# Patient Record
Sex: FEMALE | Race: WHITE | NOT HISPANIC OR LATINO | Employment: FULL TIME | URBAN - NONMETROPOLITAN AREA
[De-identification: names, ages, dates, MRNs, and addresses within clinical notes are randomized per-mention and may not be internally consistent; named-entity substitution may affect disease eponyms.]

---

## 2024-04-14 ENCOUNTER — HOSPITAL ENCOUNTER (EMERGENCY)
Facility: HOSPITAL | Age: 24
Discharge: HOME | End: 2024-04-15
Attending: STUDENT IN AN ORGANIZED HEALTH CARE EDUCATION/TRAINING PROGRAM | Admitting: STUDENT IN AN ORGANIZED HEALTH CARE EDUCATION/TRAINING PROGRAM
Payer: COMMERCIAL

## 2024-04-14 DIAGNOSIS — F32.A DEPRESSION, UNSPECIFIED DEPRESSION TYPE: Primary | ICD-10-CM

## 2024-04-14 DIAGNOSIS — R45.851 PASSIVE SUICIDAL IDEATIONS: ICD-10-CM

## 2024-04-14 PROCEDURE — 93005 ELECTROCARDIOGRAM TRACING: CPT | Performed by: PHYSICIAN ASSISTANT

## 2024-04-14 PROCEDURE — 99285 EMERGENCY DEPT VISIT HI MDM: CPT

## 2024-04-14 RX ORDER — DESVENLAFAXINE SUCCINATE 50 MG/1
50 TABLET, EXTENDED RELEASE ORAL DAILY
COMMUNITY

## 2024-04-15 VITALS
HEART RATE: 105 BPM | TEMPERATURE: 98 F | OXYGEN SATURATION: 98 % | BODY MASS INDEX: 37.73 KG/M2 | SYSTOLIC BLOOD PRESSURE: 100 MMHG | RESPIRATION RATE: 18 BRPM | DIASTOLIC BLOOD PRESSURE: 67 MMHG | HEIGHT: 62 IN | WEIGHT: 205 LBS

## 2024-04-15 LAB
ALBUMIN SERPL-MCNC: 4.5 G/DL (ref 3.5–5.2)
ALBUMIN/GLOB SERPL: 1.7 G/DL
ALP SERPL-CCNC: 66 U/L (ref 39–117)
ALT SERPL W P-5'-P-CCNC: 43 U/L (ref 1–33)
AMPHET+METHAMPHET UR QL: NEGATIVE
AMPHETAMINES UR QL: NEGATIVE
ANION GAP SERPL CALCULATED.3IONS-SCNC: 13.9 MMOL/L (ref 5–15)
APAP SERPL-MCNC: <5 MCG/ML (ref 0–30)
AST SERPL-CCNC: 24 U/L (ref 1–32)
B-HCG UR QL: NEGATIVE
BACTERIA UR QL AUTO: ABNORMAL /HPF
BARBITURATES UR QL SCN: NEGATIVE
BASOPHILS # BLD AUTO: 0 10*3/MM3 (ref 0–0.2)
BASOPHILS NFR BLD AUTO: 0 % (ref 0–1.5)
BENZODIAZ UR QL SCN: NEGATIVE
BILIRUB SERPL-MCNC: 0.7 MG/DL (ref 0–1.2)
BILIRUB UR QL STRIP: NEGATIVE
BUN SERPL-MCNC: 14 MG/DL (ref 6–20)
BUN/CREAT SERPL: 19.4 (ref 7–25)
BUPRENORPHINE SERPL-MCNC: NEGATIVE NG/ML
CALCIUM SPEC-SCNC: 9.8 MG/DL (ref 8.6–10.5)
CANNABINOIDS SERPL QL: POSITIVE
CHLORIDE SERPL-SCNC: 107 MMOL/L (ref 98–107)
CLARITY UR: ABNORMAL
CO2 SERPL-SCNC: 22.1 MMOL/L (ref 22–29)
COCAINE UR QL: NEGATIVE
COLOR UR: ABNORMAL
CREAT SERPL-MCNC: 0.72 MG/DL (ref 0.57–1)
DEPRECATED RDW RBC AUTO: 39.8 FL (ref 37–54)
EGFRCR SERPLBLD CKD-EPI 2021: 121.4 ML/MIN/1.73
EOSINOPHIL # BLD AUTO: 0 10*3/MM3 (ref 0–0.4)
EOSINOPHIL NFR BLD AUTO: 0 % (ref 0.3–6.2)
ERYTHROCYTE [DISTWIDTH] IN BLOOD BY AUTOMATED COUNT: 12.9 % (ref 12.3–15.4)
ETHANOL BLD-MCNC: <10 MG/DL (ref 0–10)
ETHANOL UR QL: <0.01 %
FENTANYL UR-MCNC: NEGATIVE NG/ML
FLUAV SUBTYP SPEC NAA+PROBE: NOT DETECTED
FLUBV RNA ISLT QL NAA+PROBE: NOT DETECTED
GLOBULIN UR ELPH-MCNC: 2.6 GM/DL
GLUCOSE SERPL-MCNC: 93 MG/DL (ref 65–99)
GLUCOSE UR STRIP-MCNC: NEGATIVE MG/DL
HCT VFR BLD AUTO: 39.7 % (ref 34–46.6)
HGB BLD-MCNC: 13.7 G/DL (ref 12–15.9)
HGB UR QL STRIP.AUTO: ABNORMAL
HYALINE CASTS UR QL AUTO: ABNORMAL /LPF
IMM GRANULOCYTES # BLD AUTO: 0.02 10*3/MM3 (ref 0–0.05)
IMM GRANULOCYTES NFR BLD AUTO: 0.3 % (ref 0–0.5)
KETONES UR QL STRIP: NEGATIVE
LEUKOCYTE ESTERASE UR QL STRIP.AUTO: ABNORMAL
LYMPHOCYTES # BLD AUTO: 3.16 10*3/MM3 (ref 0.7–3.1)
LYMPHOCYTES NFR BLD AUTO: 44.1 % (ref 19.6–45.3)
MCH RBC QN AUTO: 29.5 PG (ref 26.6–33)
MCHC RBC AUTO-ENTMCNC: 34.5 G/DL (ref 31.5–35.7)
MCV RBC AUTO: 85.6 FL (ref 79–97)
METHADONE UR QL SCN: NEGATIVE
MONOCYTES # BLD AUTO: 0.66 10*3/MM3 (ref 0.1–0.9)
MONOCYTES NFR BLD AUTO: 9.2 % (ref 5–12)
MUCOUS THREADS URNS QL MICRO: ABNORMAL /HPF
NEUTROPHILS NFR BLD AUTO: 3.32 10*3/MM3 (ref 1.7–7)
NEUTROPHILS NFR BLD AUTO: 46.4 % (ref 42.7–76)
NITRITE UR QL STRIP: NEGATIVE
NRBC BLD AUTO-RTO: 0 /100 WBC (ref 0–0.2)
OPIATES UR QL: NEGATIVE
OXYCODONE UR QL SCN: NEGATIVE
PCP UR QL SCN: NEGATIVE
PH UR STRIP.AUTO: 5.5 [PH] (ref 5–8)
PLATELET # BLD AUTO: 302 10*3/MM3 (ref 140–450)
PMV BLD AUTO: 9.6 FL (ref 6–12)
POTASSIUM SERPL-SCNC: 3.5 MMOL/L (ref 3.5–5.2)
PROT SERPL-MCNC: 7.1 G/DL (ref 6–8.5)
PROT UR QL STRIP: ABNORMAL
RBC # BLD AUTO: 4.64 10*6/MM3 (ref 3.77–5.28)
RBC # UR STRIP: ABNORMAL /HPF
REF LAB TEST METHOD: ABNORMAL
SALICYLATES SERPL-MCNC: <0.3 MG/DL
SARS-COV-2 RNA RESP QL NAA+PROBE: NOT DETECTED
SODIUM SERPL-SCNC: 143 MMOL/L (ref 136–145)
SP GR UR STRIP: >=1.03 (ref 1–1.03)
SQUAMOUS #/AREA URNS HPF: ABNORMAL /HPF
TRICYCLICS UR QL SCN: NEGATIVE
UROBILINOGEN UR QL STRIP: ABNORMAL
WBC # UR STRIP: ABNORMAL /HPF
WBC NRBC COR # BLD AUTO: 7.16 10*3/MM3 (ref 3.4–10.8)

## 2024-04-15 PROCEDURE — 80143 DRUG ASSAY ACETAMINOPHEN: CPT | Performed by: PHYSICIAN ASSISTANT

## 2024-04-15 PROCEDURE — 82077 ASSAY SPEC XCP UR&BREATH IA: CPT | Performed by: PHYSICIAN ASSISTANT

## 2024-04-15 PROCEDURE — 87636 SARSCOV2 & INF A&B AMP PRB: CPT | Performed by: PHYSICIAN ASSISTANT

## 2024-04-15 PROCEDURE — 80053 COMPREHEN METABOLIC PANEL: CPT | Performed by: PHYSICIAN ASSISTANT

## 2024-04-15 PROCEDURE — 81025 URINE PREGNANCY TEST: CPT | Performed by: PHYSICIAN ASSISTANT

## 2024-04-15 PROCEDURE — 80307 DRUG TEST PRSMV CHEM ANLYZR: CPT | Performed by: PHYSICIAN ASSISTANT

## 2024-04-15 PROCEDURE — 85025 COMPLETE CBC W/AUTO DIFF WBC: CPT | Performed by: PHYSICIAN ASSISTANT

## 2024-04-15 PROCEDURE — 80179 DRUG ASSAY SALICYLATE: CPT | Performed by: PHYSICIAN ASSISTANT

## 2024-04-15 PROCEDURE — 81001 URINALYSIS AUTO W/SCOPE: CPT | Performed by: PHYSICIAN ASSISTANT

## 2024-04-15 RX ORDER — HYDROXYZINE PAMOATE 50 MG/1
50 CAPSULE ORAL ONCE
Status: COMPLETED | OUTPATIENT
Start: 2024-04-15 | End: 2024-04-15

## 2024-04-15 RX ORDER — HYDROXYZINE PAMOATE 50 MG/1
50 CAPSULE ORAL 3 TIMES DAILY PRN
Qty: 30 CAPSULE | Refills: 0 | Status: SHIPPED | OUTPATIENT
Start: 2024-04-15

## 2024-04-15 RX ADMIN — HYDROXYZINE PAMOATE 50 MG: 50 CAPSULE ORAL at 00:47
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
## 2024-04-15 NOTE — DISCHARGE INSTRUCTIONS
Take Vistaril as prescribed as needed for anxiety.  Follow-up with mental health team for further outpatient evaluation and management of depression and anxiety.  Return to the ER for any new or worsening symptoms or acute concerns.
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
## 2024-05-08 ENCOUNTER — OFFICE VISIT (OUTPATIENT)
Dept: PSYCHIATRY | Facility: CLINIC | Age: 24
End: 2024-05-08
Payer: COMMERCIAL

## 2024-05-08 VITALS
HEART RATE: 96 BPM | HEIGHT: 62 IN | OXYGEN SATURATION: 98 % | DIASTOLIC BLOOD PRESSURE: 84 MMHG | BODY MASS INDEX: 37.54 KG/M2 | SYSTOLIC BLOOD PRESSURE: 110 MMHG | WEIGHT: 204 LBS

## 2024-05-08 DIAGNOSIS — F41.1 GENERALIZED ANXIETY DISORDER: ICD-10-CM

## 2024-05-08 DIAGNOSIS — F33.2 SEVERE EPISODE OF RECURRENT MAJOR DEPRESSIVE DISORDER, WITHOUT PSYCHOTIC FEATURES: Primary | ICD-10-CM

## 2024-05-08 PROCEDURE — 90792 PSYCH DIAG EVAL W/MED SRVCS: CPT | Performed by: NURSE PRACTITIONER

## 2024-05-08 RX ORDER — DESVENLAFAXINE 100 MG/1
100 TABLET, EXTENDED RELEASE ORAL DAILY
Qty: 30 TABLET | Refills: 5 | Status: SHIPPED | OUTPATIENT
Start: 2024-05-08

## 2024-05-08 RX ORDER — BUSPIRONE HYDROCHLORIDE 10 MG/1
10 TABLET ORAL 2 TIMES DAILY
Qty: 60 TABLET | Refills: 5 | Status: SHIPPED | OUTPATIENT
Start: 2024-05-08

## 2024-06-09 ENCOUNTER — HOSPITAL ENCOUNTER (EMERGENCY)
Facility: HOSPITAL | Age: 24
Discharge: HOME | End: 2024-06-09
Attending: EMERGENCY MEDICINE
Payer: COMMERCIAL

## 2024-06-09 VITALS
HEART RATE: 106 BPM | TEMPERATURE: 98.2 F | RESPIRATION RATE: 16 BRPM | DIASTOLIC BLOOD PRESSURE: 74 MMHG | OXYGEN SATURATION: 100 % | HEIGHT: 62 IN | WEIGHT: 205 LBS | BODY MASS INDEX: 37.73 KG/M2 | SYSTOLIC BLOOD PRESSURE: 112 MMHG

## 2024-06-09 DIAGNOSIS — L02.91 ABSCESS: Primary | ICD-10-CM

## 2024-06-09 PROCEDURE — 6370000000 HC RX 637 (ALT 250 FOR IP): Performed by: EMERGENCY MEDICINE

## 2024-06-09 PROCEDURE — 99283 EMERGENCY DEPT VISIT LOW MDM: CPT

## 2024-06-09 RX ORDER — SULFAMETHOXAZOLE AND TRIMETHOPRIM 800; 160 MG/1; MG/1
1 TABLET ORAL ONCE
Status: COMPLETED | OUTPATIENT
Start: 2024-06-09 | End: 2024-06-09

## 2024-06-09 RX ORDER — NAPROXEN 500 MG/1
500 TABLET ORAL ONCE
Status: DISCONTINUED | OUTPATIENT
Start: 2024-06-09 | End: 2024-06-09

## 2024-06-09 RX ORDER — NAPROXEN 500 MG/1
500 TABLET ORAL 2 TIMES DAILY WITH MEALS
Qty: 60 TABLET | Refills: 0 | Status: SHIPPED | OUTPATIENT
Start: 2024-06-09

## 2024-06-09 RX ORDER — SULFAMETHOXAZOLE AND TRIMETHOPRIM 800; 160 MG/1; MG/1
1 TABLET ORAL 2 TIMES DAILY
Qty: 14 TABLET | Refills: 0 | Status: SHIPPED | OUTPATIENT
Start: 2024-06-09 | End: 2024-06-16

## 2024-06-09 RX ORDER — DESVENLAFAXINE 100 MG/1
100 TABLET, EXTENDED RELEASE ORAL DAILY
COMMUNITY

## 2024-06-09 RX ORDER — BUSPIRONE HYDROCHLORIDE 10 MG/1
10 TABLET ORAL 3 TIMES DAILY
COMMUNITY

## 2024-06-09 RX ORDER — ACETAMINOPHEN 500 MG
1000 TABLET ORAL ONCE
Status: COMPLETED | OUTPATIENT
Start: 2024-06-09 | End: 2024-06-09

## 2024-06-09 RX ADMIN — ACETAMINOPHEN 1000 MG: 500 TABLET ORAL at 20:49

## 2024-06-09 RX ADMIN — SULFAMETHOXAZOLE AND TRIMETHOPRIM 1 TABLET: 800; 160 TABLET ORAL at 20:49

## 2024-06-09 ASSESSMENT — PAIN DESCRIPTION - PAIN TYPE
TYPE: ACUTE PAIN
TYPE: ACUTE PAIN

## 2024-06-09 ASSESSMENT — PAIN SCALES - GENERAL
PAINLEVEL_OUTOF10: 9
PAINLEVEL_OUTOF10: 9

## 2024-06-09 ASSESSMENT — LIFESTYLE VARIABLES: HOW OFTEN DO YOU HAVE A DRINK CONTAINING ALCOHOL: NEVER

## 2024-06-09 ASSESSMENT — PAIN DESCRIPTION - DESCRIPTORS: DESCRIPTORS: STABBING;SHARP;THROBBING

## 2024-06-09 ASSESSMENT — PAIN - FUNCTIONAL ASSESSMENT
PAIN_FUNCTIONAL_ASSESSMENT: 0-10
PAIN_FUNCTIONAL_ASSESSMENT: 0-10

## 2024-06-09 ASSESSMENT — PAIN DESCRIPTION - ORIENTATION: ORIENTATION: RIGHT

## 2024-06-09 ASSESSMENT — PAIN DESCRIPTION - LOCATION: LOCATION: BREAST

## 2024-06-10 NOTE — ED PROVIDER NOTES
[facility]  EMERGENCY DEPARTMENT ENCOUNTER        Pt Name: Vernell Shirley  MRN: [id number]  Birthdate 2000  Date of evaluation: 6/9/2024  Provider: Sylvester Razo DO  PCP: No primary care provider on file.  Note Started: 8:18 PM EDT 6/9/24    CHIEF COMPLAINT      Abscess    HISTORY OF PRESENT ILLNESS: 1 or more Elements     Chief Complaint   Patient presents with    Abscess     History from : Patient  Limitations to history : None    Vernell Shirley is a 23 y.o. female who presents to the emergency department secondary to concern for a possible abscess under her right breast.  Reports that it has been going on for about 2 weeks and is worsening.  Reports taken ibuprofen about an hour prior to arrival with no relief.  She denies recent antibiotics.  She does take metformin for PCOS related insulin resistance, but denies history of diabetes.    Past medical history noted below. Aside from what is stated above denies any other symptoms or modifying factors.   reports that she has never smoked. She has never used smokeless tobacco. She reports that she does not currently use alcohol. She reports that she does not currently use drugs.  Nursing Notes were all reviewed and agreed with or any disagreements addressed in HPI/MDM.  REVIEW OF SYSTEMS :    Review of Systems   Constitutional:  Negative for chills and fever.   HENT:  Negative for congestion and rhinorrhea.    Eyes:  Negative for pain and redness.   Respiratory:  Negative for cough and shortness of breath.    Cardiovascular:  Negative for chest pain and leg swelling.   Gastrointestinal:  Negative for abdominal pain, constipation, diarrhea, nausea and vomiting.   Genitourinary:  Negative for frequency and urgency.   Musculoskeletal:  Negative for back pain and neck pain.   Skin:  Positive for rash.   Neurological:  Negative for facial asymmetry and weakness.   Psychiatric/Behavioral:  Negative for agitation and confusion.    All

## 2024-06-10 NOTE — ED NOTES
Pt with pulse 106.  Doctor aware.  Documented pulse of 105 during previous hospital visit in April and 108 in January 2024.

## 2024-06-10 NOTE — ED NOTES
Reviewed discharge plan with Vernell Shirley.  Encouraged her to f/u with Radiology for ultra sound and with Dr Dupont as needed and she understood.  NAD noted on discharge, gait steady.  Reviewed discharge prescription for:     Current Discharge Medication List        START taking these medications    Details   naproxen (NAPROSYN) 500 MG tablet Take 1 tablet by mouth 2 times daily (with meals)  Qty: 60 tablet, Refills: 0      sulfamethoxazole-trimethoprim (BACTRIM DS;SEPTRA DS) 800-160 MG per tablet Take 1 tablet by mouth 2 times daily for 7 days  Qty: 14 tablet, Refills: 0      Zinc Oxide 10 % OINT Apply 1 Application topically in the morning and 1 Application at noon and 1 Application in the evening.  Qty: 56.7 g, Refills: 0             Vernell states understanding of how and when to take medications.      Electronically signed by Tere Gold RN on 6/9/2024 at 9:03 PM

## 2024-06-11 ENCOUNTER — HOSPITAL ENCOUNTER (EMERGENCY)
Facility: HOSPITAL | Age: 24
Discharge: HOME | End: 2024-06-12
Attending: EMERGENCY MEDICINE
Payer: COMMERCIAL

## 2024-06-11 DIAGNOSIS — L02.213 ABSCESS OF CHEST WALL: Primary | ICD-10-CM

## 2024-06-11 PROCEDURE — 99283 EMERGENCY DEPT VISIT LOW MDM: CPT

## 2024-06-11 PROCEDURE — 63710000001 ONDANSETRON ODT 4 MG TABLET DISPERSIBLE: Performed by: EMERGENCY MEDICINE

## 2024-06-11 RX ORDER — ONDANSETRON 4 MG/1
4 TABLET, ORALLY DISINTEGRATING ORAL ONCE
Status: COMPLETED | OUTPATIENT
Start: 2024-06-11 | End: 2024-06-11

## 2024-06-11 RX ORDER — HYDROCODONE BITARTRATE AND ACETAMINOPHEN 5; 325 MG/1; MG/1
1 TABLET ORAL ONCE
Status: COMPLETED | OUTPATIENT
Start: 2024-06-11 | End: 2024-06-11

## 2024-06-11 RX ORDER — LIDOCAINE HYDROCHLORIDE AND EPINEPHRINE 10; 10 MG/ML; UG/ML
10 INJECTION, SOLUTION INFILTRATION; PERINEURAL ONCE
Status: COMPLETED | OUTPATIENT
Start: 2024-06-11 | End: 2024-06-11

## 2024-06-11 RX ADMIN — ONDANSETRON 4 MG: 4 TABLET, ORALLY DISINTEGRATING ORAL at 23:41

## 2024-06-11 RX ADMIN — HYDROCODONE BITARTRATE AND ACETAMINOPHEN 1 TABLET: 5; 325 TABLET ORAL at 23:41

## 2024-06-11 RX ADMIN — LIDOCAINE HYDROCHLORIDE AND EPINEPHRINE 10 ML: 10; 10 INJECTION, SOLUTION INFILTRATION; PERINEURAL at 23:50

## 2024-06-12 VITALS
HEIGHT: 62 IN | SYSTOLIC BLOOD PRESSURE: 110 MMHG | WEIGHT: 208 LBS | HEART RATE: 84 BPM | TEMPERATURE: 98 F | RESPIRATION RATE: 18 BRPM | DIASTOLIC BLOOD PRESSURE: 72 MMHG | OXYGEN SATURATION: 98 % | BODY MASS INDEX: 38.28 KG/M2

## 2024-06-12 RX ORDER — CEPHALEXIN 500 MG/1
500 CAPSULE ORAL 3 TIMES DAILY
Qty: 30 CAPSULE | Refills: 0 | Status: SHIPPED | OUTPATIENT
Start: 2024-06-12 | End: 2024-06-22

## 2024-06-12 RX ORDER — SULFAMETHOXAZOLE AND TRIMETHOPRIM 800; 160 MG/1; MG/1
1 TABLET ORAL 2 TIMES DAILY
Qty: 20 TABLET | Refills: 0 | Status: SHIPPED | OUTPATIENT
Start: 2024-06-12 | End: 2024-06-22

## 2024-06-12 RX ORDER — CEPHALEXIN 250 MG/1
500 CAPSULE ORAL ONCE
Status: COMPLETED | OUTPATIENT
Start: 2024-06-12 | End: 2024-06-12

## 2024-06-12 RX ORDER — SULFAMETHOXAZOLE AND TRIMETHOPRIM 800; 160 MG/1; MG/1
1 TABLET ORAL ONCE
Status: COMPLETED | OUTPATIENT
Start: 2024-06-12 | End: 2024-06-12

## 2024-06-12 RX ADMIN — SULFAMETHOXAZOLE AND TRIMETHOPRIM 1 TABLET: 800; 160 TABLET ORAL at 00:12

## 2024-06-12 RX ADMIN — CEPHALEXIN 500 MG: 250 CAPSULE ORAL at 00:12

## 2024-06-12 NOTE — DISCHARGE INSTRUCTIONS
Please take your antibiotics as prescribed and follow-up with your primary care doctor for recheck of your wound in the next 2 to 3 days.  Return to the ER for any acute changes or worsening of your condition.
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
## 2024-08-01 ENCOUNTER — OFFICE VISIT (OUTPATIENT)
Dept: PSYCHIATRY | Facility: CLINIC | Age: 24
End: 2024-08-01
Payer: COMMERCIAL

## 2024-08-01 VITALS
SYSTOLIC BLOOD PRESSURE: 112 MMHG | WEIGHT: 213.8 LBS | OXYGEN SATURATION: 99 % | BODY MASS INDEX: 39.34 KG/M2 | DIASTOLIC BLOOD PRESSURE: 74 MMHG | HEIGHT: 62 IN | HEART RATE: 97 BPM

## 2024-08-01 DIAGNOSIS — F33.2 SEVERE EPISODE OF RECURRENT MAJOR DEPRESSIVE DISORDER, WITHOUT PSYCHOTIC FEATURES: Primary | Chronic | ICD-10-CM

## 2024-08-01 DIAGNOSIS — F41.1 GENERALIZED ANXIETY DISORDER: Chronic | ICD-10-CM

## 2024-08-01 PROCEDURE — 99214 OFFICE O/P EST MOD 30 MIN: CPT | Performed by: NURSE PRACTITIONER

## 2024-08-01 RX ORDER — METFORMIN HYDROCHLORIDE 500 MG/1
TABLET, EXTENDED RELEASE ORAL
COMMUNITY
Start: 2024-07-15

## 2024-08-01 NOTE — PROGRESS NOTES
"Chief Complaint  Anxiety and Depression    Subjective          Yuki Singh presents to [facility] for medication management of her anxiety and depression.    History of Present Illness: Patient presents today for follow-up appointment after last being seen on 05/08/2024 for an initial evaluation.  Patient says today \"I thought things have been getting better, but then last night my  told me he has no feelings for me and has not since March of this year\".  Patient says they had been spending more time with his family and says they seem to all be getting along very well.  She says this all came out of nowhere for her and she is very shocked today.  Patient says she just is not sure what the next steps are going to look like.  Patient says she is still working at the same job and has no concerns there.  She says work seems to be going okay.  She also says she is still taking her medications on a consistent basis and feels until yesterday they are working great for her.  Patient says \"they were working so well, it was to the point that I was doing so well I thought I was cured.  I thought it was all great\".  Patient says she just is not sure where her head is today based on last night.  She says her sleep and appetite are about the same and denies any significant issues or concerns.  She says she is just crushed emotionally right now.  She denies any SI/HI, A/V hallucinations.      The following portions of the patient's history were reviewed and updated as appropriate: allergies, current medications, past family history, past medical history, past social history, past surgical history and problem list.      Current Medications:   Current Outpatient Medications   Medication Sig Dispense Refill    busPIRone (BUSPAR) 10 MG tablet Take 1 tablet by mouth 2 (Two) Times a Day. 60 tablet 5    desvenlafaxine (PRISTIQ) 100 MG 24 hr tablet Take 1 tablet by mouth Daily. 30 tablet 5    " "metFORMIN ER (GLUCOPHAGE-XR) 500 MG 24 hr tablet TAKE 4 TABLETS BY MOUTH EVERY EVENING       No current facility-administered medications for this visit.       Mental Status Exam:   Hygiene:   good  Cooperation:  Cooperative  Eye Contact:  Good  Psychomotor Behavior:  Restless  Affect:   Tearful  Mood: anxious  Speech:  Normal  Thought Process:  Goal directed  Thought Content:  Mood congruent  Suicidal:  None  Homicidal:  None  Hallucinations:  None  Delusion:  None  Memory:  Intact  Orientation:  Person, Place, Time, and Situation  Reliability:  good  Insight:  Good  Judgement:  Good  Impulse Control:  Good  Physical/Medical Issues:   PCOS, GERD        Objective   Vital Signs:   /74   Pulse 97   Ht 157.5 cm (62\")   Wt 97 kg (213 lb 12.8 oz)   SpO2 99%   BMI 39.10 kg/m²     Physical Exam  Neurological:      Mental Status: She is oriented to person, place, and time. Mental status is at baseline.      Coordination: Coordination is intact.      Gait: Gait is intact.   Psychiatric:         Behavior: Behavior is cooperative.        Result Review :     The following data was reviewed by: ADEN Shah on 08/01/2024:    Data reviewed : Previous note, medication history           Assessment and Plan    Diagnoses and all orders for this visit:    1. Severe episode of recurrent major depressive disorder, without psychotic features (Primary)    2. Generalized anxiety disorder         PHQ-9 Score:   PHQ-9 Total Score: 15      Depression Screening:  Patient screened positive for depression based on a PHQ-9 score of 15 on 8/1/2024. Follow-up recommendations include: Prescribed antidepressant medication treatment and Suicide Risk Assessment performed.        Tobacco Cessation:  Patient has denied an present or past tobacco use. No tobacco cessation education necessary.       Impression/Plan:  -This is a follow-up appointment.  Patient presents today and says she is not doing very well.  Patient reports she was " doing much better prior to yesterday when as discussed above her  revealed he no longer has feelings for her.  Patient says she is not sure if this means they are , or possibly getting a divorce.  She says she just does not know where anything is right now.  She does say prior to yesterday she felt she was doing significantly better and was very pleased with where she was mentally.  She does say she is still taking her medications on a consistent basis and denies any adverse effects associated with them.  At her last appointment her Pristiq was increased and she was started on buspirone.  She feels this medication regimen has been adequate.  We will maintain her medication regimen as it is currently prescribed.  I encouraged her to make sure she keeps her next scheduled therapy appointment which she reports is next Wednesday.  -Maintain Pristiq 100 mg daily for depression and anxiety.  -Maintain buspirone 10 mg twice daily for anxiety.  -Patient's [facility] report reviewed and deemed appropriate.  Patient counseled on use of controlled substances.  -Reviewed available lab work.  -Schedule in person follow-up appointment for 2 months or as needed.      MEDS ORDERED DURING VISIT:  No orders of the defined types were placed in this encounter.        Follow Up   Return in about 2 months (around 10/1/2024), or if symptoms worsen or fail to improve, for Next scheduled follow up, In-Person Appt.  Patient was given instructions and counseling regarding her condition or for health maintenance advice. Please see specific information pulled into the AVS if appropriate.       TREATMENT PLAN/GOALS: Continue supportive psychotherapy efforts and medications as indicated. Treatment and medication options discussed during today's visit. Patient acknowledged and verbally consented to continue with current treatment plan and was educated on the importance of compliance with treatment and follow-up  appointments.    MEDICATION ISSUES:  Discussed medication options and treatment plan of prescribed medication as well as the risks, benefits, and side effects including potential falls, possible impaired driving and metabolic adversities among others. Patient is agreeable to call the office with any worsening of symptoms or onset of side effects. Patient is agreeable to call 911 or go to the nearest ER should he/she begin having SI/HI.          This document has been electronically signed by ADEN Gonzalez, PMHNP-BC  August 1, 2024 20:29 EDT      Part of this note may be an electronic transcription/translation of spoken language to printed text using the Dragon Dictation System.

## 2024-08-29 DIAGNOSIS — F33.2 SEVERE EPISODE OF RECURRENT MAJOR DEPRESSIVE DISORDER, WITHOUT PSYCHOTIC FEATURES: ICD-10-CM

## 2024-08-29 DIAGNOSIS — F41.1 GENERALIZED ANXIETY DISORDER: ICD-10-CM

## 2024-08-29 RX ORDER — BUSPIRONE HYDROCHLORIDE 10 MG/1
10 TABLET ORAL 2 TIMES DAILY
Qty: 60 TABLET | Refills: 5 | Status: SHIPPED | OUTPATIENT
Start: 2024-08-29

## 2024-08-29 RX ORDER — DESVENLAFAXINE 100 MG/1
100 TABLET, EXTENDED RELEASE ORAL DAILY
Qty: 30 TABLET | Refills: 5 | Status: SHIPPED | OUTPATIENT
Start: 2024-08-29

## 2024-08-29 NOTE — TELEPHONE ENCOUNTER
Patient wants scripts sent to [facility] [place], [place].    Rx Refill Note  Requested Prescriptions     Pending Prescriptions Disp Refills    busPIRone (BUSPAR) 10 MG tablet 60 tablet 5     Sig: Take 1 tablet by mouth 2 (Two) Times a Day.    desvenlafaxine (PRISTIQ) 100 MG 24 hr tablet 30 tablet 5     Sig: Take 1 tablet by mouth Daily.      Last office visit with prescribing clinician: 8/1/2024   Last telemedicine visit with prescribing clinician: Visit date not found   Next office visit with prescribing clinician: 10/21/2024                         Would you like a call back once the refill request has been completed: [] Yes [] No    If the office needs to give you a call back, can they leave a voicemail: [] Yes [] No    Janell Fine CMA  08/29/24, 11:39 EDT

## 2024-10-21 ENCOUNTER — OFFICE VISIT (OUTPATIENT)
Dept: PSYCHIATRY | Facility: CLINIC | Age: 24
End: 2024-10-21
Payer: COMMERCIAL

## 2024-10-21 VITALS
BODY MASS INDEX: 39.75 KG/M2 | HEIGHT: 62 IN | DIASTOLIC BLOOD PRESSURE: 72 MMHG | HEART RATE: 105 BPM | SYSTOLIC BLOOD PRESSURE: 124 MMHG | WEIGHT: 216 LBS | OXYGEN SATURATION: 98 %

## 2024-10-21 DIAGNOSIS — G47.09 OTHER INSOMNIA: ICD-10-CM

## 2024-10-21 DIAGNOSIS — F33.2 SEVERE EPISODE OF RECURRENT MAJOR DEPRESSIVE DISORDER, WITHOUT PSYCHOTIC FEATURES: Chronic | ICD-10-CM

## 2024-10-21 DIAGNOSIS — F41.1 GENERALIZED ANXIETY DISORDER: Primary | Chronic | ICD-10-CM

## 2024-10-21 PROCEDURE — 99214 OFFICE O/P EST MOD 30 MIN: CPT | Performed by: NURSE PRACTITIONER

## 2024-10-21 RX ORDER — TRAZODONE HYDROCHLORIDE 50 MG/1
50 TABLET, FILM COATED ORAL NIGHTLY
Qty: 30 TABLET | Refills: 2 | Status: SHIPPED | OUTPATIENT
Start: 2024-10-21

## 2024-10-21 RX ORDER — DESVENLAFAXINE 100 MG/1
100 TABLET, EXTENDED RELEASE ORAL DAILY
Qty: 30 TABLET | Refills: 5 | Status: SHIPPED | OUTPATIENT
Start: 2024-10-21

## 2024-10-21 RX ORDER — BUSPIRONE HYDROCHLORIDE 10 MG/1
10 TABLET ORAL 2 TIMES DAILY
Qty: 60 TABLET | Refills: 5 | Status: SHIPPED | OUTPATIENT
Start: 2024-10-21

## 2024-10-21 NOTE — PROGRESS NOTES
"Chief Complaint  Anxiety, Depression, and Sleeping Problem    Subjective              Yuki Singh presents to [facility] for medication management of her anxiety, depression and sleeping difficulties.    History of Present Illness: Patient presents today for follow-up appointment after last being seen on 08/01/2024.  Patient presents today and says \"12 days after my last appointment has been left\".  She says he texted her and told her he was not coming home and she has only seen him a couple times since then.  She feels alone.  Her parents are here but are getting ready to move back to [place] and her relationship is very complicated with them as it is.  She says her other siblings just do not understand.  Patient says she feels like she wasted the last year of her life and says \"I spent all that time and money and he still left me\".  She is not sleeping and says she is afraid to go into her bedroom because it is \"a hard reminder that I am all alone\".  She is often up late and then struggles to get up in the morning to get to work on time.  She reports she has continued therapy but has not been very consistent with her medications.  She says she does take buspirone daily but often forgets to take her Pristiq \"until I had a splitting headache\".  Her appetite has been diminished over the last several weeks.  She says work is the only thing that is \"keeping me sane\".  She denies any SI/HI, A/V hallucinations.      The following portions of the patient's history were reviewed and updated as appropriate: allergies, current medications, past family history, past medical history, past social history, past surgical history and problem list.      Current Medications:   Current Outpatient Medications   Medication Sig Dispense Refill    busPIRone (BUSPAR) 10 MG tablet Take 1 tablet by mouth 2 (Two) Times a Day. 60 tablet 5    desvenlafaxine (PRISTIQ) 100 MG 24 hr tablet Take 1 tablet by " "mouth Daily. 30 tablet 5    metFORMIN ER (GLUCOPHAGE-XR) 500 MG 24 hr tablet TAKE 4 TABLETS BY MOUTH EVERY EVENING      traZODone (DESYREL) 50 MG tablet Take 1 tablet by mouth Every Night. 30 tablet 2     No current facility-administered medications for this visit.       Mental Status Exam:   Hygiene:   good  Cooperation:  Cooperative  Eye Contact:  Good  Psychomotor Behavior:  Restless  Affect:   Tearful  Mood: sad and anxious  Speech:  Normal  Thought Process:  Goal directed  Thought Content:  Mood congruent  Suicidal:  None  Homicidal:  None  Hallucinations:  None  Delusion:  None  Memory:  Intact  Orientation:  Person, Place, Time, and Situation  Reliability:  good  Insight:  Good  Judgement:  Good  Impulse Control:  Good  Physical/Medical Issues:   PCOS, GERD        Objective   Vital Signs:   /72   Pulse 105   Ht 157.5 cm (62\")   Wt 98 kg (216 lb)   SpO2 98%   BMI 39.51 kg/m²     Physical Exam  Neurological:      Mental Status: She is oriented to person, place, and time. Mental status is at baseline.      Coordination: Coordination is intact.      Gait: Gait is intact.   Psychiatric:         Behavior: Behavior is cooperative.        Result Review :     The following data was reviewed by: ADEN Shah on 10/21/2024:    Data reviewed : Previous note, medication history           Assessment and Plan    Diagnoses and all orders for this visit:    1. Generalized anxiety disorder (Primary)  -     busPIRone (BUSPAR) 10 MG tablet; Take 1 tablet by mouth 2 (Two) Times a Day.  Dispense: 60 tablet; Refill: 5  -     desvenlafaxine (PRISTIQ) 100 MG 24 hr tablet; Take 1 tablet by mouth Daily.  Dispense: 30 tablet; Refill: 5    2. Severe episode of recurrent major depressive disorder, without psychotic features  -     desvenlafaxine (PRISTIQ) 100 MG 24 hr tablet; Take 1 tablet by mouth Daily.  Dispense: 30 tablet; Refill: 5    3. Other insomnia  -     traZODone (DESYREL) 50 MG tablet; Take 1 tablet by mouth " Every Night.  Dispense: 30 tablet; Refill: 2           PHQ-9 Score:   PHQ-9 Total Score: 27        Depression Screening:  Patient screened positive for depression based on a PHQ-9 score of 27 on 10/21/2024. Follow-up recommendations include: Prescribed antidepressant medication treatment and Suicide Risk Assessment performed.        Tobacco Cessation:  Patient has denied an present or past tobacco use. No tobacco cessation education necessary.       Impression/Plan:  -This is a follow-up appointment.  Patient presents today and reports she has been struggling significantly over the last several weeks.  As discussed above she is now  from her  and believes he is filing for divorce but says she cannot say for sure what has happened because she is not sure.  She does say she signed some papers through a  but does not know exactly what that means.  She has not been consistent with her medications admitting that she often forgets to take her Pristiq for a few days at a time.  She does say she is taking her buspirone twice a day on a consistent basis.  Her biggest concern right now is that she cannot sleep consistently.  She says she is generally awake until the early hours of the morning.  She has tried taking melatonin and Benadryl in the past but says they are not very efficacious and often just cause her to feel very groggy the next day.  She is interested in trying something different.  I encouraged her to be more consistent with her current medications.  -Start trazodone 50 mg nightly for sleeping difficulties.  We discussed risks versus benefits, as well as potential adverse effects associated with adding this medication to patient's daily regimen. Patient is in agreement with this plan and was educated on the importance of compliance with all aspects of treatment and follow-up appointments. Patient is agreeable to call the office with any worsening of symptoms or onset of side  effects.  -Maintain Pristiq 100 mg daily for depression and anxiety.  -Maintain buspirone 10 mg twice daily for anxiety.  -Patient's [facility] report reviewed and deemed appropriate.  Patient counseled on use of controlled substances.  -Reviewed available lab work.  -Schedule in person follow-up appointment for 1 month or as needed.      MEDS ORDERED DURING VISIT:  New Medications Ordered This Visit   Medications    traZODone (DESYREL) 50 MG tablet     Sig: Take 1 tablet by mouth Every Night.     Dispense:  30 tablet     Refill:  2    busPIRone (BUSPAR) 10 MG tablet     Sig: Take 1 tablet by mouth 2 (Two) Times a Day.     Dispense:  60 tablet     Refill:  5    desvenlafaxine (PRISTIQ) 100 MG 24 hr tablet     Sig: Take 1 tablet by mouth Daily.     Dispense:  30 tablet     Refill:  5         Follow Up   Return in about 1 month (around 11/21/2024), or if symptoms worsen or fail to improve, for Next scheduled follow up, In-Person Appt.  Patient was given instructions and counseling regarding her condition or for health maintenance advice. Please see specific information pulled into the AVS if appropriate.       TREATMENT PLAN/GOALS: Continue supportive psychotherapy efforts and medications as indicated. Treatment and medication options discussed during today's visit. Patient acknowledged and verbally consented to continue with current treatment plan and was educated on the importance of compliance with treatment and follow-up appointments.    MEDICATION ISSUES:  Discussed medication options and treatment plan of prescribed medication as well as the risks, benefits, and side effects including potential falls, possible impaired driving and metabolic adversities among others. Patient is agreeable to call the office with any worsening of symptoms or onset of side effects. Patient is agreeable to call 911 or go to the nearest ER should he/she begin having SI/HI.          This document has been electronically signed by Shari  ADEN Benito, PMHNP-BC  October 21, 2024 16:28 EDT      Part of this note may be an electronic transcription/translation of spoken language to printed text using the Dragon Dictation System.

## 2024-11-18 ENCOUNTER — OFFICE VISIT (OUTPATIENT)
Dept: PSYCHIATRY | Facility: CLINIC | Age: 24
End: 2024-11-18
Payer: COMMERCIAL

## 2024-11-18 VITALS
SYSTOLIC BLOOD PRESSURE: 126 MMHG | DIASTOLIC BLOOD PRESSURE: 90 MMHG | BODY MASS INDEX: 38.09 KG/M2 | OXYGEN SATURATION: 99 % | WEIGHT: 207 LBS | HEIGHT: 62 IN | HEART RATE: 115 BPM

## 2024-11-18 DIAGNOSIS — F33.1 MODERATE EPISODE OF RECURRENT MAJOR DEPRESSIVE DISORDER: Chronic | ICD-10-CM

## 2024-11-18 DIAGNOSIS — F41.1 GENERALIZED ANXIETY DISORDER: Primary | Chronic | ICD-10-CM

## 2024-11-18 DIAGNOSIS — G47.09 OTHER INSOMNIA: Chronic | ICD-10-CM

## 2024-11-18 PROCEDURE — 99214 OFFICE O/P EST MOD 30 MIN: CPT | Performed by: NURSE PRACTITIONER

## 2024-11-18 RX ORDER — PHENTERMINE HYDROCHLORIDE 37.5 MG/1
37.5 TABLET ORAL
COMMUNITY

## 2024-11-18 NOTE — PROGRESS NOTES
"Chief Complaint  Anxiety, Depression, and Sleeping Problem    Subjective              Yuki Singh presents to [facility] for medication management of her anxiety, depression and sleeping difficulties.    History of Present Illness: Patient presents today for follow-up appointment after last being seen on 10/21/2024.  At that appointment patient was started on trazodone 50 mg to help with sleeping difficulties.  She presents today and says \"I am better than I was last time\".  She says her divorce has progressed and they are just now waiting on the  to sign off and finalize it.  She says the process has been uncontested and says each one of them is getting what they wanted from him.  She says the only thing she will be out is \"all the money that I put into it\".  Their house is under contract and they are just waiting on that process to finalize as well.  She will be getting the profits from the sale.  She says she had any emotional \"epiphany\" this morning that she is not \"garbage\" and is feeling better about herself.  Patient says starting trazodone has been beneficial.  She says she has been taking it throughout the work week but not on the weekends.  She says she has had to find the best time to take it consistently for sleep but says when she wakes up in the morning she has more rested.  She has been consistent with all of her medications and says she has not missed her Pristiq or her BuSpar once.  Patient says she has also been going to [employer] recently with her parents and feels it has continued to be beneficial for her to be around them.  She does say she is not looking forward to the holidays.  Her appetite has been okay but has been decreased some because of medication she is taking for weight loss.  She continues to participate in therapy on a weekly basis and says that is going well.  She denies any SI/HI, A/V hallucinations.      The following portions of the " "patient's history were reviewed and updated as appropriate: allergies, current medications, past family history, past medical history, past social history, past surgical history and problem list.      Current Medications:   Current Outpatient Medications   Medication Sig Dispense Refill    busPIRone (BUSPAR) 10 MG tablet Take 1 tablet by mouth 2 (Two) Times a Day. 60 tablet 5    desvenlafaxine (PRISTIQ) 100 MG 24 hr tablet Take 1 tablet by mouth Daily. 30 tablet 5    phentermine (ADIPEX-P) 37.5 MG tablet Take 1 tablet by mouth Every Morning Before Breakfast.      traZODone (DESYREL) 50 MG tablet Take 1 tablet by mouth Every Night. 30 tablet 2     No current facility-administered medications for this visit.       Mental Status Exam:   Hygiene:   good  Cooperation:  Cooperative  Eye Contact:  Good  Psychomotor Behavior:  Appropriate  Affect:  Appropriate  Mood: euthymic  Speech:  Normal  Thought Process:  Goal directed  Thought Content:  Mood congruent  Suicidal:  None  Homicidal:  None  Hallucinations:  None  Delusion:  None  Memory:  Intact  Orientation:  Person, Place, Time, and Situation  Reliability:  good  Insight:  Good  Judgement:  Good  Impulse Control:  Good  Physical/Medical Issues:   PCOS, GERD        Objective   Vital Signs:   /90   Pulse 115   Ht 157.5 cm (62\")   Wt 93.9 kg (207 lb)   SpO2 99%   BMI 37.86 kg/m²     Physical Exam  Neurological:      Mental Status: She is oriented to person, place, and time. Mental status is at baseline.      Coordination: Coordination is intact.      Gait: Gait is intact.   Psychiatric:         Behavior: Behavior is cooperative.        Result Review :     The following data was reviewed by: ADEN Shah on 11/18/2024:    Data reviewed : Previous note, medication history           Assessment and Plan    Diagnoses and all orders for this visit:    1. Generalized anxiety disorder (Primary)    2. Moderate episode of recurrent major depressive disorder    3. " Other insomnia             PHQ-9 Score:   PHQ-9 Total Score: 10        Depression Screening:  Patient screened positive for depression based on a PHQ-9 score of 10 on 11/18/2024. Follow-up recommendations include: Prescribed antidepressant medication treatment and Suicide Risk Assessment performed.        Tobacco Cessation:  Patient has denied an present or past tobacco use. No tobacco cessation education necessary.       Impression/Plan:  -This is a follow-up appointment.  Patient presents today and says she feels as though she has been doing much better overall since she was last seen.  She says she is taking her medications as prescribed and denies any adverse effects associated with them.  She feels her current medication regimen is working well for her and says she is doing better than she was overall.  She says her sleep has especially been better since adding trazodone and she is feeling more rested throughout the day.  She denies any new issues or concerns today and says she is just focused on moving forward with life.  We will maintain her medications as she is currently taking them.  -Maintain trazodone 50 mg nightly for sleeping difficulties.    -Maintain Pristiq 100 mg daily for depression and anxiety.  -Maintain buspirone 10 mg twice daily for anxiety.  -Patient's [facility] report reviewed and deemed appropriate.  Patient counseled on use of controlled substances.  -Reviewed available lab work.  -Schedule MyChart video follow-up appointment for 2 months or as needed.      MEDS ORDERED DURING VISIT:  No orders of the defined types were placed in this encounter.        Follow Up   Return in about 2 months (around 1/18/2025), or if symptoms worsen or fail to improve, for Next scheduled follow up, Video visit.  Patient was given instructions and counseling regarding her condition or for health maintenance advice. Please see specific information pulled into the AVS if appropriate.       TREATMENT PLAN/GOALS:  Continue supportive psychotherapy efforts and medications as indicated. Treatment and medication options discussed during today's visit. Patient acknowledged and verbally consented to continue with current treatment plan and was educated on the importance of compliance with treatment and follow-up appointments.    MEDICATION ISSUES:  Discussed medication options and treatment plan of prescribed medication as well as the risks, benefits, and side effects including potential falls, possible impaired driving and metabolic adversities among others. Patient is agreeable to call the office with any worsening of symptoms or onset of side effects. Patient is agreeable to call 911 or go to the nearest ER should he/she begin having SI/HI.          This document has been electronically signed by ADEN Gonzalez, PMHNP-BC  November 18, 2024 16:27 EST      Part of this note may be an electronic transcription/translation of spoken language to printed text using the Dragon Dictation System.

## 2025-01-16 ENCOUNTER — TELEMEDICINE (OUTPATIENT)
Dept: PSYCHIATRY | Facility: CLINIC | Age: 25
End: 2025-01-16
Payer: COMMERCIAL

## 2025-01-16 DIAGNOSIS — F41.1 GENERALIZED ANXIETY DISORDER: Primary | Chronic | ICD-10-CM

## 2025-01-16 DIAGNOSIS — G47.09 OTHER INSOMNIA: Chronic | ICD-10-CM

## 2025-01-16 DIAGNOSIS — F33.2 SEVERE EPISODE OF RECURRENT MAJOR DEPRESSIVE DISORDER, WITHOUT PSYCHOTIC FEATURES: Chronic | ICD-10-CM

## 2025-01-16 RX ORDER — TRAZODONE HYDROCHLORIDE 50 MG/1
50 TABLET, FILM COATED ORAL NIGHTLY
Qty: 30 TABLET | Refills: 2 | Status: SHIPPED | OUTPATIENT
Start: 2025-01-16

## 2025-01-16 NOTE — PROGRESS NOTES
"This provider is located at [facility] ,[address], [address] in [place], [place],using a secure [employer] Video Visit through [employer]. Patient is being seen remotely via telehealth at their home address in [place], and stated they are in a secure environment for this session. The patient's condition being diagnosed/treated is appropriate for telemedicine. The provider identified herself as well as her credentials.   The patient, and/or patients guardian, consent to be seen remotely, and when consent is given they understand that the consent allows for patient identifiable information to be sent to a third party as needed.   They may refuse to be seen remotely at any time. The electronic data is encrypted and password protected, and the patient and/or guardian has been advised of the potential risks to privacy not withstanding such measures.      Mode of Visit: Video  Location of patient: -WORK-  Location of provider: +HOME+  You have chosen to receive care through a telehealth visit.  The patient has signed the video visit consent form.  The visit included audio and video interaction. No technical issues occurred during this visit.      Chief Complaint  Anxiety, Depression, and Sleeping Problem      Subjective          Yuki Singh presents today via [employer] Video through [employer] for medication management of her anxiety, depression and sleeping difficulties.     History of Present Illness: Patient presents today for follow-up appointment after last being seen on 11/18/2024.  Patient presents today and says \"I guess I am surviving\".  She says she has been consistent with her medications since she was last seen but says she is struggling significantly on a daily basis.  Patient says she had been doing okay and says she is not sure what happened but shortly after the new year she started grieving the loss of her  and says \"I cannot get him off of my mind\".  Patient " "says she finds herself sleeping excessively and says it is because it is just easier to sleep than \"spend the whole day thinking about him\".  She is still going to work consistently but says she is late on a almost daily basis.  She is finding herself increasingly late everywhere she goes and says even basic life tasks have become almost impossible at times.  She finds her self completely overwhelmed with sadness.  She is crying frequently when she is not at work.  She says she is able to \"hold it together there\".  She says she is trying to do everything she can to not let anyone know something is wrong.  Her divorce was finalized in December.  She says it just seems like she will never love or be happy with another relationship again.  She is going on a cruise next week with a friend and is hopeful that will help how she is feeling.  Her appetite has been up and down.  She admits to often having thoughts about not wanting to be alive but denies any plan or intent to harm herself.  She denies any SI/HI, A/V hallucinations.      The following portions of the patient's history were reviewed and updated as appropriate: allergies, current medications, past family history, past medical history, past social history, past surgical history and problem list.      Current Medications:   Current Outpatient Medications   Medication Sig Dispense Refill    busPIRone (BUSPAR) 10 MG tablet Take 1 tablet by mouth 2 (Two) Times a Day. 60 tablet 5    desvenlafaxine (PRISTIQ) 100 MG 24 hr tablet Take 1 tablet by mouth Daily. 30 tablet 5    phentermine (ADIPEX-P) 37.5 MG tablet Take 1 tablet by mouth Every Morning Before Breakfast.      traZODone (DESYREL) 50 MG tablet Take 1 tablet by mouth Every Night. 30 tablet 2     No current facility-administered medications for this visit.       Mental Status Exam:   Hygiene:    MyChart video  Cooperation:  Cooperative  Eye Contact:  Good  Psychomotor Behavior:  Restless  Affect:  Angry and " tearful  Mood: sad, depressed, anxious, panicky, irritable, and fluctates  Speech:  Normal  Thought Process:  Goal directed  Thought Content:  Mood congruent  Suicidal:  Death wish  Homicidal:  None  Hallucinations:  None  Delusion:  None  Memory:  Intact  Orientation:  Person, Place, Time, and Situation  Reliability:  good  Insight:  Good  Judgement:  Good  Impulse Control:  Good  Physical/Medical Issues:   PCOS, GERD        Objective   Vital Signs:   There were no vitals taken for this visit.    Physical Exam  Neurological:      Mental Status: She is oriented to person, place, and time. Mental status is at baseline.   Psychiatric:         Behavior: Behavior is cooperative.        Result Review :     The following data was reviewed by: ADEN Shah on 01/16/2025:    Data reviewed : Previous notes, medication history           Assessment and Plan    Diagnoses and all orders for this visit:    1. Generalized anxiety disorder (Primary)    2. Severe episode of recurrent major depressive disorder, without psychotic features    3. Other insomnia  -     traZODone (DESYREL) 50 MG tablet; Take 1 tablet by mouth Every Night.  Dispense: 30 tablet; Refill: 2         PHQ-9 Score:   PHQ-9 Total Score: (Patient-Rptd) 19       Depression Screening:  Patient screened positive for depression based on a PHQ-9 score of 19 on 1/16/2025. Follow-up recommendations include: Prescribed antidepressant medication treatment and Suicide Risk Assessment performed.        Tobacco Cessation:  Patient has denied an present or past tobacco use. No tobacco cessation education necessary.       Impression/Plan:  -This is a follow-up appointment.  Patient presents today and reports she has been struggling significantly over the last few weeks.  Patient says she thought she was doing relatively well until after the new year and now she finds herself completely overcome and overwhelmed with sadness, anxiety, depression, anger.  Patient says she  just does not feel like anything is going to help her.  She says the only 2 people that can help her are her sister (who has passed away) and her ex-.  We talked extensively about her feelings surrounding the end of her marriage.  She is still participating in talk therapy with Tim Freedman and says she is scheduled to see him on a weekly basis, but it was difficult to see them consistently through the holidays.  Patient admits to having a death wish at times and says she often does not see what the point in being alive is anymore.  She denies ever developing any type of plan to harm herself or having any intention of doing so.  I advised her if she finds herself having more difficulty with this or having a desire to harm herself to present to the emergency department for an evaluation.  Patient expresses understanding emphasizes she is not experiencing this.  We will maintain her medications as she has been taking them and follow up in 1 month.  I encouraged her to make sure she maintains her therapy appointments.  I also encouraged her to go on her cruise next week and try to enjoy it with her friend.  I discussed the importance of the leaning on those around us through difficult times.  -Maintain Pristiq 100 mg daily for depression and anxiety.  -Maintain buspirone 10 mg twice daily for anxiety.  -Maintain trazodone 50 mg nightly for sleeping difficulties.  -Patient's UMER report reviewed and deemed appropriate.  Patient counseled on use of controlled substances.  -Reviewed available lab work.  -Schedule MyChart video follow-up appointment for 1 month or as needed.    MEDS ORDERED DURING VISIT:  New Medications Ordered This Visit   Medications    traZODone (DESYREL) 50 MG tablet     Sig: Take 1 tablet by mouth Every Night.     Dispense:  30 tablet     Refill:  2       I spent 42 minutes caring for Yuki on this date of service. This time includes time spent by me in the following activities:preparing  for the visit, performing a medically appropriate examination and/or evaluation , counseling and educating the patient/family/caregiver, ordering medications, tests, or procedures, and documenting information in the medical record  Follow Up   Return in about 1 month (around 2/16/2025), or if symptoms worsen or fail to improve, for Next scheduled follow up, Video visit.  Patient was given instructions and counseling regarding her condition or for health maintenance advice. Please see specific information pulled into the AVS if appropriate.       TREATMENT PLAN/GOALS: Continue supportive psychotherapy efforts and medications as indicated. Treatment and medication options discussed during today's visit. Patient acknowledged and verbally consented to continue with current treatment plan and was educated on the importance of compliance with treatment and follow-up appointments.    MEDICATION ISSUES:  Discussed medication options and treatment plan of prescribed medication as well as the risks, benefits, and side effects including potential falls, possible impaired driving and metabolic adversities among others. Patient is agreeable to call the office with any worsening of symptoms or onset of side effects. Patient is agreeable to call 911 or go to the nearest ER should he/she begin having SI/HI.          This document has been electronically signed by ADEN Gonzalez, PMHNP-BC  January 16, 2025 15:59 EST      Part of this note may be an electronic transcription/translation of spoken language to printed text using the Dragon Dictation System.

## 2025-02-19 ENCOUNTER — TELEMEDICINE (OUTPATIENT)
Dept: PSYCHIATRY | Facility: CLINIC | Age: 25
End: 2025-02-19
Payer: COMMERCIAL

## 2025-02-19 DIAGNOSIS — F33.1 MODERATE EPISODE OF RECURRENT MAJOR DEPRESSIVE DISORDER: Chronic | ICD-10-CM

## 2025-02-19 DIAGNOSIS — G47.09 OTHER INSOMNIA: Chronic | ICD-10-CM

## 2025-02-19 DIAGNOSIS — F41.1 GENERALIZED ANXIETY DISORDER: Primary | Chronic | ICD-10-CM

## 2025-02-19 PROCEDURE — 99214 OFFICE O/P EST MOD 30 MIN: CPT | Performed by: NURSE PRACTITIONER

## 2025-02-19 NOTE — PROGRESS NOTES
"  Mode of Visit: Video  Location of patient: -WORK-  Location of provider: +HOME+  You have chosen to receive care through a telehealth visit.  The patient has signed the video visit consent form.  The visit included audio and video interaction. No technical issues occurred during this visit.      Chief Complaint  Anxiety, Depression, and Sleeping Problem      Subjective              Yuki Singh presents today via [employer] Video through [employer] for medication management of her anxiety, depression and sleeping difficulties.     History of Present Illness: Patient presents today for follow-up appointment after last being seen on 01/16/2024.  At that appointment she was maintained on her medication.  She presents today and reports she has been doing better since she was last seen.  She just got out of therapy appointments that she has been going there weekly since she got back from her cruise.  Patient says a cruise went very well and says \"it was great, I am ready to go on next one\".  She says it was the first time she has really relaxed in a very long time.  The patient says it helped provide some perspective for her.  She says it did not solve all of her issues but she is in a much better place now than she was.  She is still taking her medications on a consistent basis.  She takes buspirone and Pristiq on a daily basis.  She still takes trazodone most nights but generally only takes 25 mg.  She says she does not take it on the weekends.  She is sleeping and eating well and denies any issues or concerns with either.  She says overall she is much happier with where she is right now.  She denies any SI/HI, A/V hallucinations.      The following portions of the patient's history were reviewed and updated as appropriate: allergies, current medications, past family history, past medical history, past social history, past surgical history and problem list.      Current Medications:   Current Outpatient Medications "   Medication Sig Dispense Refill    busPIRone (BUSPAR) 10 MG tablet Take 1 tablet by mouth 2 (Two) Times a Day. 60 tablet 5    desvenlafaxine (PRISTIQ) 100 MG 24 hr tablet Take 1 tablet by mouth Daily. 30 tablet 5    phentermine (ADIPEX-P) 37.5 MG tablet Take 1 tablet by mouth Every Morning Before Breakfast.      traZODone (DESYREL) 50 MG tablet Take 1 tablet by mouth Every Night. 30 tablet 2     No current facility-administered medications for this visit.       Mental Status Exam:   Hygiene:    MyChart video  Cooperation:  Cooperative  Eye Contact:  Good  Psychomotor Behavior:  Appropriate  Affect:  Appropriate  Mood: euthymic  Speech:  Normal  Thought Process:  Goal directed  Thought Content:  Mood congruent  Suicidal:  None  Homicidal:  None  Hallucinations:  None  Delusion:  None  Memory:  Intact  Orientation:  Person, Place, Time, and Situation  Reliability:  good  Insight:  Good  Judgement:  Good  Impulse Control:  Good  Physical/Medical Issues:   PCOS, GERD        Objective   Vital Signs:   There were no vitals taken for this visit.    Physical Exam  Neurological:      Mental Status: She is oriented to person, place, and time. Mental status is at baseline.   Psychiatric:         Behavior: Behavior is cooperative.        Result Review :     The following data was reviewed by: ADEN Shah on 02/19/2025:    Data reviewed : Previous notes, medication history           Assessment and Plan    Diagnoses and all orders for this visit:    1. Generalized anxiety disorder (Primary)    2. Moderate episode of recurrent major depressive disorder    3. Other insomnia           PHQ-9 Score:   PHQ-9 Total Score: (Patient-Rptd) 16       Depression Screening:  Patient screened positive for depression based on a PHQ-9 score of 16 on 2/13/2025. Follow-up recommendations include: Prescribed antidepressant medication treatment and Suicide Risk Assessment performed.        Tobacco Cessation:  Patient has denied an present  or past tobacco use. No tobacco cessation education necessary.       Impression/Plan:  -This is a follow-up appointment.  Patient presents today and says she has been doing much better overall since she was last seen.  She says she is taking her medication still as prescribed and denies any adverse effects associated with them.  She feels her regimen has continued to work well for her overall symptom burden.  She is also still participating in regular talk therapy which has also continued to be highly beneficial.  She is happy overall with her current medication regimen and happy to be feeling better.  We will maintain her medications as she has been taking them and follow up in 2 months.  -Maintain Pristiq 100 mg daily for depression and anxiety.  -Maintain buspirone 10 mg twice daily for anxiety.  -Maintain trazodone 50 mg nightly for sleeping difficulties.  -Patient's UMER report reviewed and deemed appropriate.  Patient counseled on use of controlled substances.  -Reviewed available lab work.  -Schedule MyChart video follow-up appointment for 2 months or as needed.    MEDS ORDERED DURING VISIT:  No orders of the defined types were placed in this encounter.        Follow Up   Return in about 2 months (around 4/19/2025), or if symptoms worsen or fail to improve, for Next scheduled follow up, Video visit.  Patient was given instructions and counseling regarding her condition or for health maintenance advice. Please see specific information pulled into the AVS if appropriate.       TREATMENT PLAN/GOALS: Continue supportive psychotherapy efforts and medications as indicated. Treatment and medication options discussed during today's visit. Patient acknowledged and verbally consented to continue with current treatment plan and was educated on the importance of compliance with treatment and follow-up appointments.    MEDICATION ISSUES:  Discussed medication options and treatment plan of prescribed medication as well  as the risks, benefits, and side effects including potential falls, possible impaired driving and metabolic adversities among others. Patient is agreeable to call the office with any worsening of symptoms or onset of side effects. Patient is agreeable to call 911 or go to the nearest ER should he/she begin having SI/HI.          This document has been electronically signed by ADEN Gonzalez, PMHNP-BC  February 19, 2025 14:55 EST      Part of this note may be an electronic transcription/translation of spoken language to printed text using the Dragon Dictation System.

## 2025-04-23 ENCOUNTER — TELEMEDICINE (OUTPATIENT)
Dept: PSYCHIATRY | Facility: CLINIC | Age: 25
End: 2025-04-23
Payer: COMMERCIAL

## 2025-04-23 DIAGNOSIS — F33.1 MODERATE EPISODE OF RECURRENT MAJOR DEPRESSIVE DISORDER: Chronic | ICD-10-CM

## 2025-04-23 DIAGNOSIS — F41.1 GENERALIZED ANXIETY DISORDER: Primary | Chronic | ICD-10-CM

## 2025-04-23 DIAGNOSIS — G47.09 OTHER INSOMNIA: Chronic | ICD-10-CM

## 2025-04-23 PROCEDURE — 99214 OFFICE O/P EST MOD 30 MIN: CPT | Performed by: NURSE PRACTITIONER

## 2025-04-23 RX ORDER — DESVENLAFAXINE 100 MG/1
100 TABLET, EXTENDED RELEASE ORAL DAILY
Qty: 90 TABLET | Refills: 3 | Status: SHIPPED | OUTPATIENT
Start: 2025-04-23

## 2025-04-23 RX ORDER — BUSPIRONE HYDROCHLORIDE 10 MG/1
10 TABLET ORAL 2 TIMES DAILY
Qty: 180 TABLET | Refills: 3 | Status: SHIPPED | OUTPATIENT
Start: 2025-04-23

## 2025-04-23 NOTE — PROGRESS NOTES
"Mode of Visit: Video  Location of patient: -WORK-  Location of provider: +HOME+  You have chosen to receive care through a telehealth visit.  The patient has signed the video visit consent form.  The visit included audio and video interaction. No technical issues occurred during this visit.      Chief Complaint  Anxiety, Depression, and Sleeping Problem      Subjective              Yuki Singh presents today via [employer] Video through [employer] for medication management of her anxiety, depression and sleeping difficulties.     History of Present Illness: Patient presents today for follow-up appointment after last being seen on 02/19/2025.  Patient presents today and says \"I get some doing okay\".  She has been staying very busy with work and feels like she just does not do much else.  She says when she is at home she typically plays on her phone or spends time with her dogs.  She says she is ready to go on another cruise and says \"I had go on another one right now if I could\".  She is still working at [employer] and says that is going okay.  She also recently started going to [employer] again on Sundays.  She had not gone in a very long time but felt after her parents moved back to [place] she needed \"a group of people that I know who are older than me to have life experience and with them\".  She says there are several people her age that go to [employer] and recently a large group of them went out together and had a very good time.  Patient does say she continues to struggle with being worried she will be alone for the rest of her life.  She has continued to be consistent with her medications, taking Pristiq and buspirone daily as prescribed.  She says she has not needed trazodone very often and that she generally sleeps quite well without it.  She says she did start plugging her phone and in the bathroom so that she will be tempted to get on it during the night.  This has made some improvements in her sleep.  She has had " regular contact with her parents since they moved but she continues to struggle with feeling lonely most of the time.  Her appetite has been adequate and she denies any concerns there.  She denies any SI/HI, A/V hallucinations.        The following portions of the patient's history were reviewed and updated as appropriate: allergies, current medications, past family history, past medical history, past social history, past surgical history and problem list.      Current Medications:   Current Outpatient Medications   Medication Sig Dispense Refill    busPIRone (BUSPAR) 10 MG tablet Take 1 tablet by mouth 2 (Two) Times a Day. 180 tablet 3    desvenlafaxine (PRISTIQ) 100 MG 24 hr tablet Take 1 tablet by mouth Daily. 90 tablet 3    phentermine (ADIPEX-P) 37.5 MG tablet Take 1 tablet by mouth Every Morning Before Breakfast.      traZODone (DESYREL) 50 MG tablet Take 1 tablet by mouth Every Night. 30 tablet 2     No current facility-administered medications for this visit.       Mental Status Exam:   Hygiene:    MyChart video  Cooperation:  Cooperative  Eye Contact:  Good  Psychomotor Behavior:  Appropriate  Affect:  Appropriate  Mood: sad  Speech:  Normal  Thought Process:  Goal directed  Thought Content:  Mood congruent  Suicidal:  None  Homicidal:  None  Hallucinations:  None  Delusion:  None  Memory:  Intact  Orientation:  Person, Place, Time, and Situation  Reliability:  good  Insight:  Good  Judgement:  Good  Impulse Control:  Good  Physical/Medical Issues:   PCOS, GERD        Objective   Vital Signs:   There were no vitals taken for this visit.    Physical Exam  Neurological:      Mental Status: She is oriented to person, place, and time. Mental status is at baseline.   Psychiatric:         Behavior: Behavior is cooperative.        Result Review :     The following data was reviewed by: ADEN Shah on 04/23/2025:    Data reviewed : Previous notes, medication history           Assessment and Plan     Diagnoses and all orders for this visit:    1. Generalized anxiety disorder (Primary)  -     busPIRone (BUSPAR) 10 MG tablet; Take 1 tablet by mouth 2 (Two) Times a Day.  Dispense: 180 tablet; Refill: 3  -     desvenlafaxine (PRISTIQ) 100 MG 24 hr tablet; Take 1 tablet by mouth Daily.  Dispense: 90 tablet; Refill: 3    2. Moderate episode of recurrent major depressive disorder  -     desvenlafaxine (PRISTIQ) 100 MG 24 hr tablet; Take 1 tablet by mouth Daily.  Dispense: 90 tablet; Refill: 3    3. Other insomnia             PHQ-9 Score:   PHQ-9 Total Score: (Patient-Rptd) 14       Depression Screening:  Patient screened positive for depression based on a PHQ-9 score of 14 on 4/23/2025. Follow-up recommendations include: Prescribed antidepressant medication treatment and Suicide Risk Assessment performed.        Tobacco Cessation:  Patient has denied an present or past tobacco use. No tobacco cessation education necessary.       Impression/Plan:  -This is a follow-up appointment.  Patient presents today and reports she is doing okay but has also continued to struggle with her depression and anxiety at times.  She continues to feel sad and struggles with loneliness.  She says she is taking her medications as prescribed and denies any adverse effects associated with them.  She does feel her regimen is still beneficial.  I did encourage her to continue participating in events through her [employer] and to continue reaching out to be around other people.  We will maintain her medications as she has been taking them and follow up in 3 months.  -Maintain Pristiq 100 mg daily for depression and anxiety.  -Maintain buspirone 10 mg twice daily for anxiety.  -Maintain trazodone 50 mg nightly for sleeping difficulties.  -Patient's UMER report reviewed and deemed appropriate.  Patient counseled on use of controlled substances.  -Reviewed available lab work.  -Schedule [email or web address] video follow-up appointment for 3 months or as  needed.    MEDS ORDERED DURING VISIT:  New Medications Ordered This Visit   Medications    busPIRone (BUSPAR) 10 MG tablet     Sig: Take 1 tablet by mouth 2 (Two) Times a Day.     Dispense:  180 tablet     Refill:  3    desvenlafaxine (PRISTIQ) 100 MG 24 hr tablet     Sig: Take 1 tablet by mouth Daily.     Dispense:  90 tablet     Refill:  3         Follow Up   Return in about 3 months (around 7/23/2025), or if symptoms worsen or fail to improve, for Next scheduled follow up, Video visit.  Patient was given instructions and counseling regarding her condition or for health maintenance advice. Please see specific information pulled into the AVS if appropriate.       TREATMENT PLAN/GOALS: Continue supportive psychotherapy efforts and medications as indicated. Treatment and medication options discussed during today's visit. Patient acknowledged and verbally consented to continue with current treatment plan and was educated on the importance of compliance with treatment and follow-up appointments.    MEDICATION ISSUES:  Discussed medication options and treatment plan of prescribed medication as well as the risks, benefits, and side effects including potential falls, possible impaired driving and metabolic adversities among others. Patient is agreeable to call the office with any worsening of symptoms or onset of side effects. Patient is agreeable to call 911 or go to the nearest ER should he/she begin having SI/HI.          This document has been electronically signed by ADEN Gonzalez, PMHNP-BC  April 23, 2025 17:14 EDT      Part of this note may be an electronic transcription/translation of spoken language to printed text using the Dragon Dictation System.

## 2025-07-26 ENCOUNTER — HOSPITAL ENCOUNTER (OUTPATIENT)
Facility: HOSPITAL | Age: 25
Setting detail: OBSERVATION
Discharge: HOME | End: 2025-07-28
Attending: STUDENT IN AN ORGANIZED HEALTH CARE EDUCATION/TRAINING PROGRAM | Admitting: INTERNAL MEDICINE
Payer: COMMERCIAL

## 2025-07-26 ENCOUNTER — APPOINTMENT (OUTPATIENT)
Dept: CT IMAGING | Facility: HOSPITAL | Age: 25
End: 2025-07-26
Payer: COMMERCIAL

## 2025-07-26 ENCOUNTER — APPOINTMENT (OUTPATIENT)
Dept: GENERAL RADIOLOGY | Facility: HOSPITAL | Age: 25
End: 2025-07-26
Payer: COMMERCIAL

## 2025-07-26 DIAGNOSIS — J18.9 PNEUMONIA OF LEFT UPPER LOBE DUE TO INFECTIOUS ORGANISM: Primary | ICD-10-CM

## 2025-07-26 LAB
ALBUMIN SERPL-MCNC: 3.8 G/DL (ref 3.5–5.2)
ALBUMIN/GLOB SERPL: 1.1 G/DL
ALP SERPL-CCNC: 85 U/L (ref 39–117)
ALT SERPL W P-5'-P-CCNC: 24 U/L (ref 1–33)
ANION GAP SERPL CALCULATED.3IONS-SCNC: 15.6 MMOL/L (ref 5–15)
AST SERPL-CCNC: 18 U/L (ref 1–32)
BACTERIA UR QL AUTO: ABNORMAL /HPF
BASOPHILS # BLD AUTO: 0.04 10*3/MM3 (ref 0–0.2)
BASOPHILS NFR BLD AUTO: 0.2 % (ref 0–1.5)
BILIRUB SERPL-MCNC: 1.6 MG/DL (ref 0–1.2)
BILIRUB UR QL STRIP: NEGATIVE
BUN SERPL-MCNC: 12 MG/DL (ref 6–20)
BUN/CREAT SERPL: 13 (ref 7–25)
CALCIUM SPEC-SCNC: 9.4 MG/DL (ref 8.6–10.5)
CHLORIDE SERPL-SCNC: 99 MMOL/L (ref 98–107)
CLARITY UR: CLEAR
CO2 SERPL-SCNC: 22.4 MMOL/L (ref 22–29)
COLOR UR: ABNORMAL
CREAT SERPL-MCNC: 0.92 MG/DL (ref 0.57–1)
CRP SERPL-MCNC: 47.83 MG/DL (ref 0–0.5)
D DIMER PPP FEU-MCNC: 1.32 MCGFEU/ML (ref 0–0.5)
D-LACTATE SERPL-SCNC: 1.8 MMOL/L (ref 0.5–2)
DEPRECATED RDW RBC AUTO: 37.2 FL (ref 37–54)
EGFRCR SERPLBLD CKD-EPI 2021: 89.9 ML/MIN/1.73
EOSINOPHIL # BLD AUTO: 0.02 10*3/MM3 (ref 0–0.4)
EOSINOPHIL NFR BLD AUTO: 0.1 % (ref 0.3–6.2)
ERYTHROCYTE [DISTWIDTH] IN BLOOD BY AUTOMATED COUNT: 12.1 % (ref 12.3–15.4)
FLUAV SUBTYP SPEC NAA+PROBE: NOT DETECTED
FLUBV RNA NPH QL NAA+NON-PROBE: NOT DETECTED
GEN 5 1HR TROPONIN T REFLEX: <6 NG/L
GLOBULIN UR ELPH-MCNC: 3.5 GM/DL
GLUCOSE SERPL-MCNC: 90 MG/DL (ref 65–99)
GLUCOSE UR STRIP-MCNC: NEGATIVE MG/DL
HCT VFR BLD AUTO: 37.8 % (ref 34–46.6)
HETEROPH AB SER QL LA: NEGATIVE
HGB BLD-MCNC: 13.2 G/DL (ref 12–15.9)
HGB UR QL STRIP.AUTO: NEGATIVE
HOLD SPECIMEN: NORMAL
HOLD SPECIMEN: NORMAL
HYALINE CASTS UR QL AUTO: ABNORMAL /LPF
IMM GRANULOCYTES # BLD AUTO: 0.2 10*3/MM3 (ref 0–0.05)
IMM GRANULOCYTES NFR BLD AUTO: 1.1 % (ref 0–0.5)
KETONES UR QL STRIP: ABNORMAL
LEUKOCYTE ESTERASE UR QL STRIP.AUTO: ABNORMAL
LYMPHOCYTES # BLD AUTO: 1.3 10*3/MM3 (ref 0.7–3.1)
LYMPHOCYTES NFR BLD AUTO: 7.3 % (ref 19.6–45.3)
MCH RBC QN AUTO: 29.3 PG (ref 26.6–33)
MCHC RBC AUTO-ENTMCNC: 34.9 G/DL (ref 31.5–35.7)
MCV RBC AUTO: 84 FL (ref 79–97)
MONOCYTES # BLD AUTO: 0.64 10*3/MM3 (ref 0.1–0.9)
MONOCYTES NFR BLD AUTO: 3.6 % (ref 5–12)
NEUTROPHILS NFR BLD AUTO: 15.69 10*3/MM3 (ref 1.7–7)
NEUTROPHILS NFR BLD AUTO: 87.7 % (ref 42.7–76)
NITRITE UR QL STRIP: NEGATIVE
NRBC BLD AUTO-RTO: 0 /100 WBC (ref 0–0.2)
PH UR STRIP.AUTO: 6.5 [PH] (ref 5–8)
PLATELET # BLD AUTO: 260 10*3/MM3 (ref 140–450)
PMV BLD AUTO: 9.2 FL (ref 6–12)
POTASSIUM SERPL-SCNC: 3.2 MMOL/L (ref 3.5–5.2)
PROCALCITONIN SERPL-MCNC: 1.44 NG/ML (ref 0–0.25)
PROT SERPL-MCNC: 7.3 G/DL (ref 6–8.5)
PROT UR QL STRIP: ABNORMAL
RBC # BLD AUTO: 4.5 10*6/MM3 (ref 3.77–5.28)
RBC # UR STRIP: ABNORMAL /HPF
REF LAB TEST METHOD: ABNORMAL
SARS-COV-2 RNA RESP QL NAA+PROBE: NOT DETECTED
SODIUM SERPL-SCNC: 137 MMOL/L (ref 136–145)
SP GR UR STRIP: 1.03 (ref 1–1.03)
SQUAMOUS #/AREA URNS HPF: ABNORMAL /HPF
TROPONIN T NUMERIC DELTA: NORMAL
TROPONIN T SERPL HS-MCNC: <6 NG/L
UROBILINOGEN UR QL STRIP: ABNORMAL
WBC # UR STRIP: ABNORMAL /HPF
WBC NRBC COR # BLD AUTO: 17.89 10*3/MM3 (ref 3.4–10.8)
WHOLE BLOOD HOLD COAG: NORMAL
WHOLE BLOOD HOLD SPECIMEN: NORMAL

## 2025-07-26 PROCEDURE — 96365 THER/PROPH/DIAG IV INF INIT: CPT

## 2025-07-26 PROCEDURE — 71275 CT ANGIOGRAPHY CHEST: CPT

## 2025-07-26 PROCEDURE — 96375 TX/PRO/DX INJ NEW DRUG ADDON: CPT

## 2025-07-26 PROCEDURE — 25010000002 KETOROLAC TROMETHAMINE PER 15 MG: Performed by: PHYSICIAN ASSISTANT

## 2025-07-26 PROCEDURE — 84484 ASSAY OF TROPONIN QUANT: CPT | Performed by: STUDENT IN AN ORGANIZED HEALTH CARE EDUCATION/TRAINING PROGRAM

## 2025-07-26 PROCEDURE — 99285 EMERGENCY DEPT VISIT HI MDM: CPT | Performed by: STUDENT IN AN ORGANIZED HEALTH CARE EDUCATION/TRAINING PROGRAM

## 2025-07-26 PROCEDURE — 84145 PROCALCITONIN (PCT): CPT | Performed by: PHYSICIAN ASSISTANT

## 2025-07-26 PROCEDURE — 87040 BLOOD CULTURE FOR BACTERIA: CPT | Performed by: PHYSICIAN ASSISTANT

## 2025-07-26 PROCEDURE — 71045 X-RAY EXAM CHEST 1 VIEW: CPT

## 2025-07-26 PROCEDURE — 36415 COLL VENOUS BLD VENIPUNCTURE: CPT

## 2025-07-26 PROCEDURE — 25510000001 IOPAMIDOL 61 % SOLUTION: Performed by: STUDENT IN AN ORGANIZED HEALTH CARE EDUCATION/TRAINING PROGRAM

## 2025-07-26 PROCEDURE — 86308 HETEROPHILE ANTIBODY SCREEN: CPT | Performed by: PHYSICIAN ASSISTANT

## 2025-07-26 PROCEDURE — 81001 URINALYSIS AUTO W/SCOPE: CPT | Performed by: PHYSICIAN ASSISTANT

## 2025-07-26 PROCEDURE — 86140 C-REACTIVE PROTEIN: CPT | Performed by: PHYSICIAN ASSISTANT

## 2025-07-26 PROCEDURE — 85025 COMPLETE CBC W/AUTO DIFF WBC: CPT | Performed by: STUDENT IN AN ORGANIZED HEALTH CARE EDUCATION/TRAINING PROGRAM

## 2025-07-26 PROCEDURE — 25810000003 SODIUM CHLORIDE 0.9 % SOLUTION: Performed by: PHYSICIAN ASSISTANT

## 2025-07-26 PROCEDURE — 83605 ASSAY OF LACTIC ACID: CPT | Performed by: PHYSICIAN ASSISTANT

## 2025-07-26 PROCEDURE — 93005 ELECTROCARDIOGRAM TRACING: CPT | Performed by: STUDENT IN AN ORGANIZED HEALTH CARE EDUCATION/TRAINING PROGRAM

## 2025-07-26 PROCEDURE — 85379 FIBRIN DEGRADATION QUANT: CPT | Performed by: PHYSICIAN ASSISTANT

## 2025-07-26 PROCEDURE — G0378 HOSPITAL OBSERVATION PER HR: HCPCS

## 2025-07-26 PROCEDURE — 87636 SARSCOV2 & INF A&B AMP PRB: CPT | Performed by: PHYSICIAN ASSISTANT

## 2025-07-26 PROCEDURE — 25010000002 CEFTRIAXONE PER 250 MG: Performed by: PHYSICIAN ASSISTANT

## 2025-07-26 PROCEDURE — 80053 COMPREHEN METABOLIC PANEL: CPT | Performed by: STUDENT IN AN ORGANIZED HEALTH CARE EDUCATION/TRAINING PROGRAM

## 2025-07-26 RX ORDER — SODIUM CHLORIDE 0.9 % (FLUSH) 0.9 %
10 SYRINGE (ML) INJECTION AS NEEDED
Status: DISCONTINUED | OUTPATIENT
Start: 2025-07-26 | End: 2025-07-28 | Stop reason: HOSPADM

## 2025-07-26 RX ORDER — KETOROLAC TROMETHAMINE 30 MG/ML
30 INJECTION, SOLUTION INTRAMUSCULAR; INTRAVENOUS ONCE
Status: COMPLETED | OUTPATIENT
Start: 2025-07-26 | End: 2025-07-26

## 2025-07-26 RX ORDER — IOPAMIDOL 612 MG/ML
85 INJECTION, SOLUTION INTRAVASCULAR
Status: COMPLETED | OUTPATIENT
Start: 2025-07-26 | End: 2025-07-26

## 2025-07-26 RX ADMIN — SODIUM CHLORIDE 1000 ML: 9 INJECTION, SOLUTION INTRAVENOUS at 22:13

## 2025-07-26 RX ADMIN — KETOROLAC TROMETHAMINE 30 MG: 30 INJECTION, SOLUTION INTRAMUSCULAR at 23:30

## 2025-07-26 RX ADMIN — CEFTRIAXONE SODIUM 2000 MG: 2 INJECTION, POWDER, FOR SOLUTION INTRAMUSCULAR; INTRAVENOUS at 23:30

## 2025-07-26 RX ADMIN — IOPAMIDOL 85 ML: 612 INJECTION, SOLUTION INTRAVENOUS at 23:19

## 2025-07-27 PROBLEM — J18.9 PNEUMONIA: Status: ACTIVE | Noted: 2025-07-27

## 2025-07-27 LAB
ANION GAP SERPL CALCULATED.3IONS-SCNC: 12.9 MMOL/L (ref 5–15)
BASOPHILS # BLD AUTO: 0.02 10*3/MM3 (ref 0–0.2)
BASOPHILS NFR BLD AUTO: 0.1 % (ref 0–1.5)
BUN SERPL-MCNC: 10 MG/DL (ref 6–20)
BUN/CREAT SERPL: 14.7 (ref 7–25)
CALCIUM SPEC-SCNC: 8.7 MG/DL (ref 8.6–10.5)
CHLORIDE SERPL-SCNC: 104 MMOL/L (ref 98–107)
CO2 SERPL-SCNC: 20.1 MMOL/L (ref 22–29)
CREAT SERPL-MCNC: 0.68 MG/DL (ref 0.57–1)
DEPRECATED RDW RBC AUTO: 38.5 FL (ref 37–54)
EGFRCR SERPLBLD CKD-EPI 2021: 125.7 ML/MIN/1.73
EOSINOPHIL # BLD AUTO: 0.01 10*3/MM3 (ref 0–0.4)
EOSINOPHIL NFR BLD AUTO: 0.1 % (ref 0.3–6.2)
ERYTHROCYTE [DISTWIDTH] IN BLOOD BY AUTOMATED COUNT: 12.3 % (ref 12.3–15.4)
GLUCOSE SERPL-MCNC: 83 MG/DL (ref 65–99)
HCT VFR BLD AUTO: 29.9 % (ref 34–46.6)
HGB BLD-MCNC: 10.4 G/DL (ref 12–15.9)
HIV 1+2 AB+HIV1P24 AG SERPLBLD IA.RAPID: NORMAL
HIV 1+2 AB+HIV1P24 AG SERPLBLD IA.RAPID: NORMAL
IMM GRANULOCYTES # BLD AUTO: 0.18 10*3/MM3 (ref 0–0.05)
IMM GRANULOCYTES NFR BLD AUTO: 1.2 % (ref 0–0.5)
LYMPHOCYTES # BLD AUTO: 1.74 10*3/MM3 (ref 0.7–3.1)
LYMPHOCYTES NFR BLD AUTO: 12 % (ref 19.6–45.3)
MCH RBC QN AUTO: 29.7 PG (ref 26.6–33)
MCHC RBC AUTO-ENTMCNC: 34.8 G/DL (ref 31.5–35.7)
MCV RBC AUTO: 85.4 FL (ref 79–97)
MONOCYTES # BLD AUTO: 0.63 10*3/MM3 (ref 0.1–0.9)
MONOCYTES NFR BLD AUTO: 4.3 % (ref 5–12)
MRSA DNA SPEC QL NAA+PROBE: NORMAL
NEUTROPHILS NFR BLD AUTO: 11.95 10*3/MM3 (ref 1.7–7)
NEUTROPHILS NFR BLD AUTO: 82.3 % (ref 42.7–76)
NRBC BLD AUTO-RTO: 0 /100 WBC (ref 0–0.2)
PLATELET # BLD AUTO: 224 10*3/MM3 (ref 140–450)
PMV BLD AUTO: 9.9 FL (ref 6–12)
POTASSIUM SERPL-SCNC: 3.4 MMOL/L (ref 3.5–5.2)
POTASSIUM SERPL-SCNC: 3.6 MMOL/L (ref 3.5–5.2)
POTASSIUM SERPL-SCNC: 4.2 MMOL/L (ref 3.5–5.2)
RBC # BLD AUTO: 3.5 10*6/MM3 (ref 3.77–5.28)
SODIUM SERPL-SCNC: 137 MMOL/L (ref 136–145)
WBC NRBC COR # BLD AUTO: 14.53 10*3/MM3 (ref 3.4–10.8)

## 2025-07-27 PROCEDURE — G0378 HOSPITAL OBSERVATION PER HR: HCPCS

## 2025-07-27 PROCEDURE — 87641 MR-STAPH DNA AMP PROBE: CPT | Performed by: INTERNAL MEDICINE

## 2025-07-27 PROCEDURE — 25010000002 ENOXAPARIN PER 10 MG: Performed by: INTERNAL MEDICINE

## 2025-07-27 PROCEDURE — 25810000003 SODIUM CHLORIDE 0.9 % SOLUTION 250 ML FLEX CONT: Performed by: PHYSICIAN ASSISTANT

## 2025-07-27 PROCEDURE — 87205 SMEAR GRAM STAIN: CPT | Performed by: INTERNAL MEDICINE

## 2025-07-27 PROCEDURE — 96372 THER/PROPH/DIAG INJ SC/IM: CPT

## 2025-07-27 PROCEDURE — 96376 TX/PRO/DX INJ SAME DRUG ADON: CPT

## 2025-07-27 PROCEDURE — 84132 ASSAY OF SERUM POTASSIUM: CPT | Performed by: INTERNAL MEDICINE

## 2025-07-27 PROCEDURE — 25010000002 ONDANSETRON PER 1 MG: Performed by: PHYSICIAN ASSISTANT

## 2025-07-27 PROCEDURE — 25010000002 ONDANSETRON PER 1 MG: Performed by: INTERNAL MEDICINE

## 2025-07-27 PROCEDURE — 86635 COCCIDIOIDES ANTIBODY: CPT | Performed by: INTERNAL MEDICINE

## 2025-07-27 PROCEDURE — 96375 TX/PRO/DX INJ NEW DRUG ADDON: CPT

## 2025-07-27 PROCEDURE — 99222 1ST HOSP IP/OBS MODERATE 55: CPT | Performed by: INTERNAL MEDICINE

## 2025-07-27 PROCEDURE — G0475 HIV COMBINATION ASSAY: HCPCS | Performed by: INTERNAL MEDICINE

## 2025-07-27 PROCEDURE — 85025 COMPLETE CBC W/AUTO DIFF WBC: CPT | Performed by: INTERNAL MEDICINE

## 2025-07-27 PROCEDURE — 25810000003 SODIUM CHLORIDE 0.9 % SOLUTION: Performed by: PHYSICIAN ASSISTANT

## 2025-07-27 PROCEDURE — 80048 BASIC METABOLIC PNL TOTAL CA: CPT | Performed by: INTERNAL MEDICINE

## 2025-07-27 PROCEDURE — 25010000002 DOXYCYCLINE 100 MG RECONSTITUTED SOLUTION 1 EACH VIAL: Performed by: INTERNAL MEDICINE

## 2025-07-27 PROCEDURE — 25010000002 AZITHROMYCIN PER 500 MG: Performed by: PHYSICIAN ASSISTANT

## 2025-07-27 PROCEDURE — 25010000002 AMPICILLIN-SULBACTAM PER 1.5 G: Performed by: INTERNAL MEDICINE

## 2025-07-27 PROCEDURE — 87070 CULTURE OTHR SPECIMN AEROBIC: CPT | Performed by: INTERNAL MEDICINE

## 2025-07-27 RX ORDER — ONDANSETRON 2 MG/ML
4 INJECTION INTRAMUSCULAR; INTRAVENOUS ONCE
Status: COMPLETED | OUTPATIENT
Start: 2025-07-27 | End: 2025-07-27

## 2025-07-27 RX ORDER — NITROGLYCERIN 0.4 MG/1
0.4 TABLET SUBLINGUAL
Status: DISCONTINUED | OUTPATIENT
Start: 2025-07-27 | End: 2025-07-28 | Stop reason: HOSPADM

## 2025-07-27 RX ORDER — ACETAMINOPHEN 160 MG/5ML
650 SOLUTION ORAL EVERY 4 HOURS PRN
Status: DISCONTINUED | OUTPATIENT
Start: 2025-07-27 | End: 2025-07-28 | Stop reason: HOSPADM

## 2025-07-27 RX ORDER — ACETAMINOPHEN 325 MG/1
650 TABLET ORAL EVERY 4 HOURS PRN
Status: DISCONTINUED | OUTPATIENT
Start: 2025-07-27 | End: 2025-07-28 | Stop reason: HOSPADM

## 2025-07-27 RX ORDER — POTASSIUM CHLORIDE 1500 MG/1
40 TABLET, EXTENDED RELEASE ORAL EVERY 4 HOURS
Status: COMPLETED | OUTPATIENT
Start: 2025-07-27 | End: 2025-07-27

## 2025-07-27 RX ORDER — AMOXICILLIN 250 MG
2 CAPSULE ORAL 2 TIMES DAILY PRN
Status: DISCONTINUED | OUTPATIENT
Start: 2025-07-27 | End: 2025-07-28 | Stop reason: HOSPADM

## 2025-07-27 RX ORDER — SODIUM CHLORIDE 0.9 % (FLUSH) 0.9 %
10 SYRINGE (ML) INJECTION AS NEEDED
Status: DISCONTINUED | OUTPATIENT
Start: 2025-07-27 | End: 2025-07-28 | Stop reason: HOSPADM

## 2025-07-27 RX ORDER — ONDANSETRON 2 MG/ML
4 INJECTION INTRAMUSCULAR; INTRAVENOUS EVERY 6 HOURS PRN
Status: DISCONTINUED | OUTPATIENT
Start: 2025-07-27 | End: 2025-07-28 | Stop reason: HOSPADM

## 2025-07-27 RX ORDER — GUAIFENESIN 600 MG/1
600 TABLET, EXTENDED RELEASE ORAL EVERY 12 HOURS SCHEDULED
Status: DISCONTINUED | OUTPATIENT
Start: 2025-07-27 | End: 2025-07-28 | Stop reason: HOSPADM

## 2025-07-27 RX ORDER — ACETAMINOPHEN 650 MG/1
650 SUPPOSITORY RECTAL EVERY 4 HOURS PRN
Status: DISCONTINUED | OUTPATIENT
Start: 2025-07-27 | End: 2025-07-28 | Stop reason: HOSPADM

## 2025-07-27 RX ORDER — ENOXAPARIN SODIUM 100 MG/ML
40 INJECTION SUBCUTANEOUS DAILY
Status: DISCONTINUED | OUTPATIENT
Start: 2025-07-27 | End: 2025-07-28 | Stop reason: HOSPADM

## 2025-07-27 RX ORDER — SODIUM CHLORIDE 9 MG/ML
40 INJECTION, SOLUTION INTRAVENOUS AS NEEDED
Status: DISCONTINUED | OUTPATIENT
Start: 2025-07-27 | End: 2025-07-28 | Stop reason: HOSPADM

## 2025-07-27 RX ORDER — BUSPIRONE HYDROCHLORIDE 10 MG/1
10 TABLET ORAL 2 TIMES DAILY
Status: DISCONTINUED | OUTPATIENT
Start: 2025-07-27 | End: 2025-07-28 | Stop reason: HOSPADM

## 2025-07-27 RX ORDER — DESVENLAFAXINE 50 MG/1
100 TABLET, FILM COATED, EXTENDED RELEASE ORAL DAILY
Status: DISCONTINUED | OUTPATIENT
Start: 2025-07-27 | End: 2025-07-28 | Stop reason: HOSPADM

## 2025-07-27 RX ORDER — POLYETHYLENE GLYCOL 3350 17 G/17G
17 POWDER, FOR SOLUTION ORAL DAILY PRN
Status: DISCONTINUED | OUTPATIENT
Start: 2025-07-27 | End: 2025-07-28 | Stop reason: HOSPADM

## 2025-07-27 RX ORDER — IPRATROPIUM BROMIDE AND ALBUTEROL SULFATE 2.5; .5 MG/3ML; MG/3ML
3 SOLUTION RESPIRATORY (INHALATION) EVERY 6 HOURS PRN
Status: DISCONTINUED | OUTPATIENT
Start: 2025-07-27 | End: 2025-07-28 | Stop reason: HOSPADM

## 2025-07-27 RX ORDER — SODIUM CHLORIDE 0.9 % (FLUSH) 0.9 %
10 SYRINGE (ML) INJECTION EVERY 12 HOURS SCHEDULED
Status: DISCONTINUED | OUTPATIENT
Start: 2025-07-27 | End: 2025-07-28 | Stop reason: HOSPADM

## 2025-07-27 RX ORDER — BISACODYL 5 MG/1
5 TABLET, DELAYED RELEASE ORAL DAILY PRN
Status: DISCONTINUED | OUTPATIENT
Start: 2025-07-27 | End: 2025-07-28 | Stop reason: HOSPADM

## 2025-07-27 RX ORDER — BISACODYL 10 MG
10 SUPPOSITORY, RECTAL RECTAL DAILY PRN
Status: DISCONTINUED | OUTPATIENT
Start: 2025-07-27 | End: 2025-07-28 | Stop reason: HOSPADM

## 2025-07-27 RX ORDER — FLUCONAZOLE 100 MG/1
400 TABLET ORAL EVERY 24 HOURS
Status: DISCONTINUED | OUTPATIENT
Start: 2025-07-27 | End: 2025-07-28 | Stop reason: HOSPADM

## 2025-07-27 RX ADMIN — ACETAMINOPHEN 650 MG: 325 TABLET ORAL at 22:02

## 2025-07-27 RX ADMIN — AMPICILLIN SODIUM AND SULBACTAM SODIUM 3 G: 2; 1 INJECTION, POWDER, FOR SOLUTION INTRAMUSCULAR; INTRAVENOUS at 08:34

## 2025-07-27 RX ADMIN — Medication 10 ML: at 22:03

## 2025-07-27 RX ADMIN — GUAIFENESIN 600 MG: 600 TABLET, EXTENDED RELEASE ORAL at 22:02

## 2025-07-27 RX ADMIN — AMPICILLIN SODIUM AND SULBACTAM SODIUM 3 G: 2; 1 INJECTION, POWDER, FOR SOLUTION INTRAMUSCULAR; INTRAVENOUS at 16:35

## 2025-07-27 RX ADMIN — DOXYCYCLINE 100 MG: 100 INJECTION, POWDER, LYOPHILIZED, FOR SOLUTION INTRAVENOUS at 17:57

## 2025-07-27 RX ADMIN — POTASSIUM CHLORIDE 40 MEQ: 1500 TABLET, EXTENDED RELEASE ORAL at 08:34

## 2025-07-27 RX ADMIN — AMPICILLIN SODIUM AND SULBACTAM SODIUM 3 G: 2; 1 INJECTION, POWDER, FOR SOLUTION INTRAMUSCULAR; INTRAVENOUS at 04:10

## 2025-07-27 RX ADMIN — ONDANSETRON 4 MG: 2 INJECTION, SOLUTION INTRAMUSCULAR; INTRAVENOUS at 04:19

## 2025-07-27 RX ADMIN — SODIUM CHLORIDE 646 ML: 9 INJECTION, SOLUTION INTRAVENOUS at 00:13

## 2025-07-27 RX ADMIN — Medication 10 ML: at 08:35

## 2025-07-27 RX ADMIN — Medication 5 MG: at 22:37

## 2025-07-27 RX ADMIN — ONDANSETRON 4 MG: 2 INJECTION, SOLUTION INTRAMUSCULAR; INTRAVENOUS at 00:13

## 2025-07-27 RX ADMIN — GUAIFENESIN 600 MG: 600 TABLET, EXTENDED RELEASE ORAL at 08:34

## 2025-07-27 RX ADMIN — BUSPIRONE HYDROCHLORIDE 10 MG: 10 TABLET ORAL at 08:34

## 2025-07-27 RX ADMIN — AZITHROMYCIN DIHYDRATE 500 MG: 500 INJECTION, POWDER, LYOPHILIZED, FOR SOLUTION INTRAVENOUS at 00:04

## 2025-07-27 RX ADMIN — ENOXAPARIN SODIUM 40 MG: 100 INJECTION SUBCUTANEOUS at 08:34

## 2025-07-27 RX ADMIN — DOXYCYCLINE 100 MG: 100 INJECTION, POWDER, LYOPHILIZED, FOR SOLUTION INTRAVENOUS at 04:10

## 2025-07-27 RX ADMIN — AMPICILLIN SODIUM AND SULBACTAM SODIUM 3 G: 2; 1 INJECTION, POWDER, FOR SOLUTION INTRAMUSCULAR; INTRAVENOUS at 22:03

## 2025-07-27 RX ADMIN — BUSPIRONE HYDROCHLORIDE 10 MG: 10 TABLET ORAL at 22:02

## 2025-07-27 RX ADMIN — POTASSIUM CHLORIDE 40 MEQ: 1500 TABLET, EXTENDED RELEASE ORAL at 04:10

## 2025-07-27 RX ADMIN — POTASSIUM CHLORIDE 40 MEQ: 1500 TABLET, EXTENDED RELEASE ORAL at 22:03

## 2025-07-27 RX ADMIN — SODIUM CHLORIDE 1000 ML: 9 INJECTION, SOLUTION INTRAVENOUS at 00:06

## 2025-07-27 RX ADMIN — FLUCONAZOLE 400 MG: 100 TABLET ORAL at 11:49

## 2025-07-27 RX ADMIN — POTASSIUM CHLORIDE 40 MEQ: 1500 TABLET, EXTENDED RELEASE ORAL at 16:34

## 2025-07-27 RX ADMIN — DESVENLAFAXINE SUCCINATE 100 MG: 50 TABLET, EXTENDED RELEASE ORAL at 08:34

## 2025-07-27 NOTE — CASE MANAGEMENT/SOCIAL WORK
Discharge Planning Assessment  [facility]     Patient Name: Yuki Singh  MRN: [id number]  Today's Date: 7/27/2025    Admit Date: 7/26/2025        Discharge Needs Assessment       Row Name 07/27/25 1015       Living Environment    People in Home alone    Current Living Arrangements apartment    Duration at Residence 1 yr    Potentially Unsafe Housing Conditions none    In the past 12 months has the electric, gas, oil, or water company threatened to shut off services in your home? No    Primary Care Provided by self    Family Caregiver if Needed friend(s)    Quality of Family Relationships helpful    Able to Return to Prior Arrangements yes       Resource/Environmental Concerns    Transportation Concerns none       Transportation Needs    In the past 12 months, has lack of transportation kept you from medical appointments or from getting medications? no    In the past 12 months, has lack of transportation kept you from meetings, work, or from getting things needed for daily living? No       Food Insecurity    Within the past 12 months, you worried that your food would run out before you got the money to buy more. Never true    Within the past 12 months, the food you bought just didn't last and you didn't have money to get more. Never true       Discharge Needs Assessment    Readmission Within the Last 30 Days no previous admission in last 30 days    Concerns to be Addressed denies needs/concerns at this time    Do you want help finding or keeping work or a job? I do not need or want help    Do you want help with school or training? For example, starting or completing job training or getting a high school diploma, GED or equivalent No    Anticipated Changes Related to Illness none    Equipment Needed After Discharge none                   Discharge Plan       Row Name 07/27/25 1017       Plan    Plan Comments Dcp at bedside with pt providing demographics. No AD, POA, DME,  service, or financial hardship.   [facility] is her primary, [facility] is pharmacy, agreeable to meds to bed. Pt resides alone, has been at current address for 1 yr, recently . She drives, works full time, is independent. Pt plans to return home at [place].                  Continued Care and Services - Admitted Since 7/26/2025    No active coordination exists.          Demographic Summary       Row Name 07/27/25 1014       General Information    Admission Type inpatient    Arrived From emergency department    Referral Source admission list    Reason for Consult discharge planning    Preferred Language English       Contact Information    Permission Granted to Share Info With family/designee                   Functional Status       Row Name 07/27/25 1014       Functional Status    Usual Activity Tolerance good    Current Activity Tolerance moderate       Physical Activity    On average, how many days per week do you engage in moderate to strenuous exercise (like a brisk walk)? 7 days    On average, how many minutes do you engage in exercise at this level? 20 min    Number of minutes of exercise per week 140       Functional Status, IADL    Medications independent    Meal Preparation independent    Housekeeping independent    Laundry independent    Shopping independent    If for any reason you need help with day-to-day activities such as bathing, preparing meals, shopping, managing finances, etc., do you get the help you need? I don't need any help       Mental Status    General Appearance WDL X  ill       Mental Status Summary    Recent Changes in Mental Status/Cognitive Functioning no changes       Employment/    Employment Status employed full-time    Current or Previous Occupation service industry                   Psychosocial    No documentation.                  Abuse/Neglect    No documentation.                  Legal    No documentation.                  Substance Abuse    No documentation.                  Patient Forms    No  documentation.                     Naomi Worley RN

## 2025-07-27 NOTE — H&P
[facility]      Name:  Yuki Singh   Age:  24 y.o.  Sex:  female  :  2000  MRN:  [id number]   Visit Number:  [id number]  Admission Date:  2025  Date Of Service:  25  Primary Care Physician:  Cindi Sheriff MD    Chief Complaint:     Shortness of breath, chest pain    History Of Presenting Illness:      Patient is a 24-year-old female with history significant for anxiety/depression who presents to the emergency room tonight with progressively worsening shortness of breath and associated chest pain.  Patient describes chest pain as a sharp sensation worse with deep inspiration on the left side.  Denies pressure-like sensation or radiation of pain.  She states that she has been feeling sick for approximately 1 week with shortness of breath and cough productive of yellow sputum.  Patient also admits to nasal congestion and nausea with some posttussive emesis.  She states that she returned from a flight from [place] prior to symptom onset.  Was seen in urgent care on  and prescribed Augmentin for a sinus infection.  Patient has been compliant with her antibiotic therapy but symptoms worsened.  Patient's mother advised her to come to the emergency room tonight.  Patient denies smoking or vaping.  Denies alcohol and illicit drug use.  She has never had pneumonia before and has no underlying lung pathology.  Workup in the emergency room consistent with sepsis secondary to left-sided pneumonia.  Fortunately she is stable on room air.  As patient has failed outpatient antibiotic therapy, hospitalist has been asked to admit for initiation of IV antibiotics.      Review Of Systems:    All systems were reviewed and negative except as mentioned in history of presenting illness, assessment and plan.    Past Medical History: Patient  has a past medical history of Anxiety, Depression, and GERD (gastroesophageal reflux disease).    Past Surgical History:  Patient  has a past surgical history that includes Eye surgery (8/13/2020).    Social History: Patient  reports that she has never smoked. She has never been exposed to tobacco smoke. She has never used smokeless tobacco. She reports that she does not drink alcohol and does not use drugs.    Family History:  Patient's family history has been reviewed and found to be noncontributory.     Allergies:      Patient has no known allergies.    Home Medications:    Prior to Admission Medications       Prescriptions Last Dose Informant Patient Reported? Taking?    busPIRone (BUSPAR) 10 MG tablet   No No    Take 1 tablet by mouth 2 (Two) Times a Day.    desvenlafaxine (PRISTIQ) 100 MG 24 hr tablet   No No    Take 1 tablet by mouth Daily.    phentermine (ADIPEX-P) 37.5 MG tablet   Yes No    Take 1 tablet by mouth Every Morning Before Breakfast.    traZODone (DESYREL) 50 MG tablet   No No    Take 1 tablet by mouth Every Night.          ED Medications:    Medications   sodium chloride 0.9 % flush 10 mL (has no administration in time range)   azithromycin (ZITHROMAX) 500 mg in sodium chloride 0.9 % 250 mL IVPB-VTB (500 mg Intravenous New Bag 7/27/25 0004)   sodium chloride 0.9 % bolus 1,000 mL (0 mL Intravenous Stopped 7/27/25 0003)   ketorolac (TORADOL) injection 30 mg (30 mg Intravenous Given 7/26/25 2330)   iopamidol (ISOVUE-300) 61 % injection 85 mL (85 mL Intravenous Given 7/26/25 2319)   cefTRIAXone (ROCEPHIN) 2,000 mg in sodium chloride 0.9 % 100 mL IVPB-VTB (0 mg Intravenous Stopped 7/27/25 0003)   sodium chloride 0.9 % bolus 1,000 mL (1,000 mL Intravenous New Bag 7/27/25 0006)   sodium chloride 0.9 % bolus 646 mL (646 mL Intravenous New Bag 7/27/25 0013)   ondansetron (ZOFRAN) injection 4 mg (4 mg Intravenous Given 7/27/25 0013)     Vital Signs:  Temp:  [98.5 °F (36.9 °C)] 98.5 °F (36.9 °C)  Heart Rate:  [112-138] 117  Resp:  [18-20] 18  BP: ()/(45-81) 98/45        07/26/25  4840   Weight: 88.2 kg (194 lb 6.4 oz)  "    Body mass index is 35.56 kg/m².    Physical Exam:     Most recent vital Signs: BP 98/45   Pulse 117   Temp 98.5 °F (36.9 °C) (Oral)   Resp 18   Ht 157.5 cm (62\")   Wt 88.2 kg (194 lb 6.4 oz)   LMP 07/04/2025   SpO2 99%   BMI 35.56 kg/m²     Physical Exam  Vitals reviewed.   Constitutional:       General: She is not in acute distress.     Appearance: She is obese.   HENT:      Head: Normocephalic and atraumatic.      Right Ear: External ear normal.      Left Ear: External ear normal.      Nose: Congestion present.      Mouth/Throat:      Mouth: Mucous membranes are moist.      Pharynx: Oropharynx is clear.   Eyes:      Extraocular Movements: Extraocular movements intact.      Conjunctiva/sclera: Conjunctivae normal.   Cardiovascular:      Rate and Rhythm: Normal rate and regular rhythm.      Pulses: Normal pulses.      Heart sounds: Normal heart sounds.   Pulmonary:      Effort: Pulmonary effort is normal.      Breath sounds: Normal breath sounds.   Abdominal:      General: Bowel sounds are normal.      Palpations: Abdomen is soft.   Musculoskeletal:         General: Normal range of motion.   Skin:     General: Skin is warm and dry.   Neurological:      General: No focal deficit present.      Mental Status: She is alert and oriented to person, place, and time.         Laboratory data:    I have reviewed the labs done in the emergency room.    Results from last 7 days   Lab Units 07/26/25  2205   SODIUM mmol/L 137   POTASSIUM mmol/L 3.2*   CHLORIDE mmol/L 99   CO2 mmol/L 22.4   BUN mg/dL 12.0   CREATININE mg/dL 0.92   CALCIUM mg/dL 9.4   BILIRUBIN mg/dL 1.6*   ALK PHOS U/L 85   ALT (SGPT) U/L 24   AST (SGOT) U/L 18   GLUCOSE mg/dL 90     Results from last 7 days   Lab Units 07/26/25  2205   WBC 10*3/mm3 17.89*   HEMOGLOBIN g/dL 13.2   HEMATOCRIT % 37.8   PLATELETS 10*3/mm3 260         Results from last 7 days   Lab Units 07/26/25  2307 07/26/25  2205   HSTROP T ng/L <6 <6                     Results from " last 7 days   Lab Units 07/26/25  2312   COLOR UA  Dark Yellow*   GLUCOSE UA  Negative   KETONES UA  40 mg/dL (2+)*   BLOOD UA  Negative   LEUKOCYTES UA  Trace*   PH, URINE  6.5   BILIRUBIN UA  Negative   UROBILINOGEN UA  2.0 E.U./dL*   RBC UA /HPF None Seen   WBC UA /HPF 0-2       Pain Management Panel          Latest Ref Rng & Units 4/15/2024   Pain Management Panel   Amphetamine, Urine Qual Negative Negative    Barbiturates Screen, Urine Negative Negative    Benzodiazepine Screen, Urine Negative Negative    Buprenorphine, Screen, Urine Negative Negative    Cocaine Screen, Urine Negative Negative    Fentanyl, Urine Negative Negative    Methadone Screen , Urine Negative Negative    Methamphetamine, Ur Negative Negative        EKG:      EKG personally reviewed, sinus tachycardia with rate of 133 bpm.  No acute ST or T wave changes.    Radiology:    CT Angiogram Chest Pulmonary Embolism  Result Date: 7/27/2025  FINAL REPORT TECHNIQUE: null CLINICAL HISTORY: soa, tachycardia, abnormal cxr COMPARISON: null FINDINGS: CTA chest Comparison: None provided. Findings: Heterogeneity in pulmonary arteries felt to be artifactual secondary to body habitus and respiratory motion with no pulmonary embolism appreciated. Close CTA follow-up recommended as clinically indicated. Slight left pleural effusion. Large consolidation  with air bronchograms left upper lobe. Patchy small consolidations and groundglass densities bilaterally. These findings most likely represent pneumonia. CT follow-up to resolution recommended as other underlying pathologic process including malignancy cannot be excluded. No significant adenopathy seen. No pneumothorax. Unremarkable thoracic aorta with no aneurysm or dissection.     Impression: 1. No PE seen. 2. Large left upper lobe consolidation, patchy bilateral groundglass densities, and slight left pleural effusion most likely related to pneumonia. Follow-up to resolution recommended. Authenticated and  Electronically Signed by Bar Garcia MD on 07/27/2025 12:18:05 AM      Assessment:    Sepsis  Pneumonia  Hypokalemia  Depression  Obesity    Plan:    Sepsis  Pneumonia  SIRS criteria: Heart rate greater than 90, leukocytosis 17.89  Patient failed outpatient therapy with Augmentin  Continue to monitor oxygen saturation.  Currently stable on room air.  Empiric Unasyn and doxycycline  MRSA screen  Collect sputum culture if able  DuoNebs as needed  Hypokalemia  Replete as indicated per protocol  Depression  Continue home medications  Obesity  Complicates all aspects of care    Risk Assessment: High  DVT Prophylaxis: Lovenox  Code Status: Full  Diet: Regular        Ruben Coelho DO  07/27/25  00:53 EDT    Dictated utilizing Dragon dictation.

## 2025-07-27 NOTE — PLAN OF CARE
Goal Outcome Evaluation:  Plan of Care Reviewed With: patient        Progress: improving  Outcome Evaluation: new admit

## 2025-07-27 NOTE — PAYOR COMM NOTE
"    OBSERVATION NOTIFICATION ONLY  UR MANAGER; TRISTON FLYNN, RN  [phone] AND [phone]     NPI:  [id number]  TAX ID:  [id number]        Jorge Oleary \"Safia\" (24 y.o. Female)       Date of Birth   2000    Social Security Number       Address   [address] [place], [place] [address]    Home Phone   [phone]    MRN   [id number]       Gnosticist   None    Marital Status                               Admission Date   7/26/2025    Admission Type   Emergency    Admitting Provider   Cirilo Pittman DO    Attending Provider   Cirilo Pittman DO    Department, Room/Bed   [facility] TELEMETRY 3, 322/1       Discharge Date       Discharge Disposition       Discharge Destination                                 Attending Provider: Cirilo Pittman DO    Allergies: No Known Allergies    Isolation: None   Infection: None   Code Status: CPR    Ht: 157.5 cm (62\")   Wt: 88.2 kg (194 lb 7.1 oz)    Admission Cmt: None   Principal Problem: Pneumonia [J18.9]                   Active Insurance as of 7/26/2025       Primary Coverage       Payor Plan Insurance Group Employer/Plan Group    [employer]HEM BLUE CROSS ANTHEM BLUE CROSS BLUE SHIELD PPO [id number]       Payor Plan Address Payor Plan Phone Number Payor Plan Fax Number Effective Dates    [address] [phone]  12/12/2024 - None Entered    [place] [address]         Subscriber Name Subscriber Birth Date Member ID       JORGE OLEARY 2000 [id number]                     Emergency Contacts        (Rel.) Home Phone Work Phone Mobile Phone    BRITTANY OLEARY (Spouse) -- -- [phone]              Physician Progress Notes (last 24 hours)  Notes from 07/26/25 1002 through 07/27/25 1002   No notes of this type exist for this encounter.       "

## 2025-07-27 NOTE — PLAN OF CARE
Goal Outcome Evaluation:        Problem: Adult Inpatient Plan of Care  Goal: Plan of Care Review  Outcome: Progressing  Goal: Patient-Specific Goal (Individualized)  Outcome: Progressing  Goal: Absence of Hospital-Acquired Illness or Injury  Outcome: Progressing  Intervention: Identify and Manage Fall Risk  Recent Flowsheet Documentation  Taken 7/27/2025 1600 by Michell Oro LPN  Safety Promotion/Fall Prevention:   safety round/check completed   room organization consistent  Taken 7/27/2025 1400 by Michell Oro LPN  Safety Promotion/Fall Prevention:   safety round/check completed   room organization consistent  Taken 7/27/2025 1200 by Michell Oro LPN  Safety Promotion/Fall Prevention:   safety round/check completed   room organization consistent  Taken 7/27/2025 1000 by Michell Oro LPN  Safety Promotion/Fall Prevention:   safety round/check completed   room organization consistent  Taken 7/27/2025 0834 by Michell Oro LPN  Safety Promotion/Fall Prevention:   safety round/check completed   room organization consistent  Intervention: Prevent Skin Injury  Recent Flowsheet Documentation  Taken 7/27/2025 0834 by Michell Oro LPN  Body Position: sitting up in bed  Intervention: Prevent and Manage VTE (Venous Thromboembolism) Risk  Recent Flowsheet Documentation  Taken 7/27/2025 0834 by Michell Oro LPN  VTE Prevention/Management: (lovenox) other (see comments)  Goal: Optimal Comfort and Wellbeing  Outcome: Progressing  Intervention: Monitor Pain and Promote Comfort  Recent Flowsheet Documentation  Taken 7/27/2025 0834 by Michell Oro LPN  Pain Management Interventions: pain management plan reviewed with patient/caregiver  Intervention: Provide Person-Centered Care  Recent Flowsheet Documentation  Taken 7/27/2025 0834 by Michell Oro LPN  Trust Relationship/Rapport:   care explained   choices provided   questions answered  Goal: Readiness for Transition of  Care  Outcome: Progressing  Goal: Plan of Care Review  Outcome: Progressing  Goal: Patient-Specific Goal (Individualized)  Outcome: Progressing  Goal: Absence of Hospital-Acquired Illness or Injury  Outcome: Progressing  Intervention: Identify and Manage Fall Risk  Recent Flowsheet Documentation  Taken 7/27/2025 1600 by Michell Oro LPN  Safety Promotion/Fall Prevention:   safety round/check completed   room organization consistent  Taken 7/27/2025 1400 by Michell Oro LPN  Safety Promotion/Fall Prevention:   safety round/check completed   room organization consistent  Taken 7/27/2025 1200 by Michell Oro LPN  Safety Promotion/Fall Prevention:   safety round/check completed   room organization consistent  Taken 7/27/2025 1000 by Michell Oro LPN  Safety Promotion/Fall Prevention:   safety round/check completed   room organization consistent  Taken 7/27/2025 0834 by Michell Oro LPN  Safety Promotion/Fall Prevention:   safety round/check completed   room organization consistent  Intervention: Prevent Skin Injury  Recent Flowsheet Documentation  Taken 7/27/2025 0834 by Michell Oro LPN  Body Position: sitting up in bed  Intervention: Prevent and Manage VTE (Venous Thromboembolism) Risk  Recent Flowsheet Documentation  Taken 7/27/2025 0834 by Michell Oro LPN  VTE Prevention/Management: (lovenox) other (see comments)  Goal: Optimal Comfort and Wellbeing  Outcome: Progressing  Intervention: Monitor Pain and Promote Comfort  Recent Flowsheet Documentation  Taken 7/27/2025 0834 by Michell Oro LPN  Pain Management Interventions: pain management plan reviewed with patient/caregiver  Intervention: Provide Person-Centered Care  Recent Flowsheet Documentation  Taken 7/27/2025 0834 by Michell Oro LPN  Trust Relationship/Rapport:   care explained   choices provided   questions answered  Goal: Readiness for Transition of Care  Outcome: Progressing     Problem: Fall Injury  Risk  Goal: Absence of Fall and Fall-Related Injury  Outcome: Progressing  Intervention: Identify and Manage Contributors  Recent Flowsheet Documentation  Taken 7/27/2025 1600 by Michell Oro LPN  Medication Review/Management: medications reviewed  Taken 7/27/2025 1200 by Michell Oro LPN  Medication Review/Management: medications reviewed  Taken 7/27/2025 0834 by Michell Oro LPN  Medication Review/Management: medications reviewed  Intervention: Promote Injury-Free Environment  Recent Flowsheet Documentation  Taken 7/27/2025 1600 by Michell Oro LPN  Safety Promotion/Fall Prevention:   safety round/check completed   room organization consistent  Taken 7/27/2025 1400 by Michell Oro LPN  Safety Promotion/Fall Prevention:   safety round/check completed   room organization consistent  Taken 7/27/2025 1200 by Michell Oro LPN  Safety Promotion/Fall Prevention:   safety round/check completed   room organization consistent  Taken 7/27/2025 1000 by Michell Oro LPN  Safety Promotion/Fall Prevention:   safety round/check completed   room organization consistent  Taken 7/27/2025 0834 by Michell Oro LPN  Safety Promotion/Fall Prevention:   safety round/check completed   room organization consistent     Problem: Gas Exchange Impaired  Goal: Optimal Gas Exchange  Outcome: Progressing  Intervention: Optimize Oxygenation and Ventilation  Recent Flowsheet Documentation  Taken 7/27/2025 1600 by Michell Oro LPN  Head of Bed (HOB) Positioning: HOB elevated  Taken 7/27/2025 1400 by Michell Oro LPN  Head of Bed (HOB) Positioning: HOB elevated  Taken 7/27/2025 1000 by Michell Oro LPN  Head of Bed (HOB) Positioning: HOB elevated  Taken 7/27/2025 0834 by Michell Oro LPN  Head of Bed (HOB) Positioning: HOB elevated     Problem: Pneumonia  Goal: Fluid Balance  Outcome: Progressing  Goal: Absence of Infection Signs and Symptoms  Outcome: Progressing  Goal:  Effective Oxygenation and Ventilation  Outcome: Progressing  Intervention: Promote Airway Secretion Clearance  Recent Flowsheet Documentation  Taken 7/27/2025 1600 by Michell Oro LPN  Activity Management: activity encouraged  Cough And Deep Breathing: done independently per patient  Taken 7/27/2025 1400 by Michell Oro LPN  Activity Management: activity encouraged  Taken 7/27/2025 1200 by Michell Oro LPN  Activity Management: activity encouraged  Cough And Deep Breathing: done independently per patient  Taken 7/27/2025 1000 by Michell Oro LPN  Activity Management: activity encouraged  Taken 7/27/2025 0834 by Michell Oro LPN  Activity Management: activity encouraged  Cough And Deep Breathing: done independently per patient  Intervention: Optimize Oxygenation and Ventilation  Recent Flowsheet Documentation  Taken 7/27/2025 1600 by Michell Oro LPN  Head of Bed (HOB) Positioning: HOB elevated  Taken 7/27/2025 1400 by Michell Oro LPN  Head of Bed (HOB) Positioning: HOB elevated  Taken 7/27/2025 1000 by Michell Oro LPN  Head of Bed (HOB) Positioning: HOB elevated  Taken 7/27/2025 0834 by Michell Oro LPN  Head of Bed (HOB) Positioning: HOB elevated     Problem: Sepsis/Septic Shock  Goal: Optimal Coping  Outcome: Progressing  Intervention: Support Patient and Family Response  Recent Flowsheet Documentation  Taken 7/27/2025 0834 by Michell Oro LPN  Family/Support System Care:   support provided   self-care encouraged  Goal: Absence of Bleeding  Outcome: Progressing  Goal: Blood Glucose Level Within Target Range  Outcome: Progressing  Goal: Absence of Infection Signs and Symptoms  Outcome: Progressing  Intervention: Promote Recovery  Recent Flowsheet Documentation  Taken 7/27/2025 1600 by Michell Oro LPN  Activity Management: activity encouraged  Taken 7/27/2025 1400 by Michell Oro LPN  Activity Management: activity encouraged  Taken 7/27/2025  1200 by Michell Oro LPN  Activity Management: activity encouraged  Taken 7/27/2025 1000 by Michell Oro LPN  Activity Management: activity encouraged  Taken 7/27/2025 0834 by Michell Oro LPN  Activity Management: activity encouraged  Goal: Optimal Nutrition Delivery  Outcome: Progressing
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
## 2025-07-27 NOTE — PLAN OF CARE
Goal Outcome Evaluation:  Plan of Care Reviewed With: patient           Outcome Evaluation: new admit

## 2025-07-27 NOTE — PROGRESS NOTES
[facility] HOSPITALIST    PROGRESS NOTE    Name:  Yuki Singh   Age:  24 y.o.  Sex:  female  :  2000  MRN:  [id number]   Visit Number:  [id number]  Admission Date:  2025  Date Of Service:  25  Primary Care Physician:  Cindi Sheriff MD     LOS: 1 day :    Chief Complaint:      dyspnea    Subjective:    2025: Admitting provider documentation reviewed.  SIRS improving anticipate 1 more day. Agrees to HIV testing. [place] endemic for [place]ever. Agreeable to trying Fluconazole. Antibodies sent.    History Of Presenting Illness:       Patient is a 24-year-old female with history significant for anxiety/depression who presents to the emergency room tonight with progressively worsening shortness of breath and associated chest pain.  Patient describes chest pain as a sharp sensation worse with deep inspiration on the left side.  Denies pressure-like sensation or radiation of pain.  She states that she has been feeling sick for approximately 1 week with shortness of breath and cough productive of yellow sputum.  Patient also admits to nasal congestion and nausea with some posttussive emesis.  She states that she returned from a flight from [place] prior to symptom onset.  Was seen in urgent care on  and prescribed Augmentin for a sinus infection.  Patient has been compliant with her antibiotic therapy but symptoms worsened.  Patient's mother advised her to come to the emergency room tonight.  Patient denies smoking or vaping.  Denies alcohol and illicit drug use.  She has never had pneumonia before and has no underlying lung pathology.  Workup in the emergency room consistent with sepsis secondary to left-sided pneumonia.  Fortunately she is stable on room air.  As patient has failed outpatient antibiotic therapy, hospitalist has been asked to admit for initiation of IV antibiotics.    Edited by: Cirilo Pittman DO at 2025 1024  "    Review of Systems:     All systems were reviewed and negative except as mentioned in subjective, assessment and plan.    Vital Signs:    Temp:  [98.5 °F (36.9 °C)-99.4 °F (37.4 °C)] 98.8 °F (37.1 °C)  Heart Rate:  [103-138] 103  Resp:  [18-20] 20  BP: ()/(45-81) 107/70    Intake and output:    I/O last 3 completed shifts:  In: 860 [P.O.:360; I.V.:500]  Out: -   I/O this shift:  In: 300 [P.O.:300]  Out: -     Physical Examination:    Constitutional: No acute distress, awake, alert  HENT: NCAT, mucous membranes moist, nasal congestion noted  Respiratory: Clear to auscultation bilaterally, respiratory effort normal   Cardiovascular: RRR, no murmurs, rubs, or gallops  Gastrointestinal: Positive bowel sounds, soft, nontender, nondistended  Musculoskeletal: No bilateral ankle edema  Psychiatric: Appropriate affect, cooperative  Neurologic: Oriented x 3, speech clear  Skin: No rashes  Edited by: Cirilo Pittman DO at 7/27/2025 0739     Laboratory results:    Results from last 7 days   Lab Units 07/27/25  0540 07/26/25  2205   SODIUM mmol/L 137 137   POTASSIUM mmol/L 3.4* 3.2*   CHLORIDE mmol/L 104 99   CO2 mmol/L 20.1* 22.4   BUN mg/dL 10.0 12.0   CREATININE mg/dL 0.68 0.92   CALCIUM mg/dL 8.7 9.4   BILIRUBIN mg/dL  --  1.6*   ALK PHOS U/L  --  85   ALT (SGPT) U/L  --  24   AST (SGOT) U/L  --  18   GLUCOSE mg/dL 83 90     Results from last 7 days   Lab Units 07/27/25  0540 07/26/25  2205   WBC 10*3/mm3 14.53* 17.89*   HEMOGLOBIN g/dL 10.4* 13.2   HEMATOCRIT % 29.9* 37.8   PLATELETS 10*3/mm3 224 260         Results from last 7 days   Lab Units 07/26/25  2307 07/26/25  2205   HSTROP T ng/L <6 <6         No results for input(s): \"PHART\", \"XVO9UFS\", \"PO2ART\", \"UCC0UEI\", \"BASEEXCESS\" in the last 8760 hours.   I have reviewed the patient's laboratory results.    Radiology results:    CT Angiogram Chest Pulmonary Embolism  Result Date: 7/27/2025  FINAL REPORT TECHNIQUE: null CLINICAL HISTORY: soa, tachycardia, " abnormal cxr COMPARISON: null FINDINGS: CTA chest Comparison: None provided. Findings: Heterogeneity in pulmonary arteries felt to be artifactual secondary to body habitus and respiratory motion with no pulmonary embolism appreciated. Close CTA follow-up recommended as clinically indicated. Slight left pleural effusion. Large consolidation  with air bronchograms left upper lobe. Patchy small consolidations and groundglass densities bilaterally. These findings most likely represent pneumonia. CT follow-up to resolution recommended as other underlying pathologic process including malignancy cannot be excluded. No significant adenopathy seen. No pneumothorax. Unremarkable thoracic aorta with no aneurysm or dissection.     Impression: Impression: 1. No PE seen. 2. Large left upper lobe consolidation, patchy bilateral groundglass densities, and slight left pleural effusion most likely related to pneumonia. Follow-up to resolution recommended. Authenticated and Electronically Signed by Bar Garcia MD on 07/27/2025 12:18:05 AM    I have reviewed the patient's radiology reports.    Medication Review:     I have reviewed the patient's active and prn medications.     Problem List:      Pneumonia      Assessment/Plan:    This patient's problems and plans were partially entered by my partner and updated as appropriate by me 07/27/25.    Sepsis  Pneumonia  Hypokalemia  Depression  Obesity     Plan:     Sepsis  Pneumonia  R/o [place] Fever  SIRS criteria: Heart rate greater than 90 (has baseline resting HR of 100), leukocytosis 17.89. COVID/Flu.  Treatment: Diflucan, Unasyn, doxycycline, Failed outpatient antibiotics (Augmentin), duonebs,  Pending: coccidiodes ab, HIV, MRSA, MRSA, sputum/blood cultures. Will need repeat CT outpatient Consider bronch if not improving.  Hypokalemia  Replete as indicated per protocol, suspect dilutional  Depression  Continue home medications  Obesity  Complicates all aspects of  care    DVT Prophylaxis: Lovenox  Code Status: Full  Diet: Regular  Disposition: Anticipate home independently tomorrow  Edited by: Cirilo Pittman DO at 7/27/2025 1040           Cirilo Pittman DO  07/27/25  10:40 EDT    Dictated utilizing Dragon dictation.

## 2025-07-28 VITALS
BODY MASS INDEX: 35.78 KG/M2 | SYSTOLIC BLOOD PRESSURE: 99 MMHG | HEART RATE: 94 BPM | RESPIRATION RATE: 18 BRPM | TEMPERATURE: 98.3 F | HEIGHT: 62 IN | WEIGHT: 194.45 LBS | DIASTOLIC BLOOD PRESSURE: 61 MMHG | OXYGEN SATURATION: 97 %

## 2025-07-28 LAB
ANION GAP SERPL CALCULATED.3IONS-SCNC: 9.7 MMOL/L (ref 5–15)
BUN SERPL-MCNC: 7 MG/DL (ref 6–20)
BUN/CREAT SERPL: 10.8 (ref 7–25)
CALCIUM SPEC-SCNC: 8.8 MG/DL (ref 8.6–10.5)
CHLORIDE SERPL-SCNC: 109 MMOL/L (ref 98–107)
CO2 SERPL-SCNC: 20.3 MMOL/L (ref 22–29)
CREAT SERPL-MCNC: 0.65 MG/DL (ref 0.57–1)
DEPRECATED RDW RBC AUTO: 39.8 FL (ref 37–54)
EGFRCR SERPLBLD CKD-EPI 2021: 127.1 ML/MIN/1.73
ERYTHROCYTE [DISTWIDTH] IN BLOOD BY AUTOMATED COUNT: 12.7 % (ref 12.3–15.4)
GLUCOSE SERPL-MCNC: 80 MG/DL (ref 65–99)
HCT VFR BLD AUTO: 31.2 % (ref 34–46.6)
HGB BLD-MCNC: 10.7 G/DL (ref 12–15.9)
MCH RBC QN AUTO: 29.6 PG (ref 26.6–33)
MCHC RBC AUTO-ENTMCNC: 34.3 G/DL (ref 31.5–35.7)
MCV RBC AUTO: 86.4 FL (ref 79–97)
PLATELET # BLD AUTO: 253 10*3/MM3 (ref 140–450)
PMV BLD AUTO: 9.7 FL (ref 6–12)
POTASSIUM SERPL-SCNC: 4.2 MMOL/L (ref 3.5–5.2)
RBC # BLD AUTO: 3.61 10*6/MM3 (ref 3.77–5.28)
SODIUM SERPL-SCNC: 139 MMOL/L (ref 136–145)
WBC NRBC COR # BLD AUTO: 11.2 10*3/MM3 (ref 3.4–10.8)

## 2025-07-28 PROCEDURE — 99239 HOSP IP/OBS DSCHRG MGMT >30: CPT | Performed by: INTERNAL MEDICINE

## 2025-07-28 PROCEDURE — 96372 THER/PROPH/DIAG INJ SC/IM: CPT

## 2025-07-28 PROCEDURE — 25010000002 AMPICILLIN-SULBACTAM PER 1.5 G: Performed by: INTERNAL MEDICINE

## 2025-07-28 PROCEDURE — 80048 BASIC METABOLIC PNL TOTAL CA: CPT | Performed by: INTERNAL MEDICINE

## 2025-07-28 PROCEDURE — G0378 HOSPITAL OBSERVATION PER HR: HCPCS

## 2025-07-28 PROCEDURE — 85027 COMPLETE CBC AUTOMATED: CPT | Performed by: INTERNAL MEDICINE

## 2025-07-28 PROCEDURE — 25010000002 ENOXAPARIN PER 10 MG: Performed by: INTERNAL MEDICINE

## 2025-07-28 PROCEDURE — 25010000002 DOXYCYCLINE 100 MG RECONSTITUTED SOLUTION 1 EACH VIAL: Performed by: INTERNAL MEDICINE

## 2025-07-28 RX ORDER — ONDANSETRON 4 MG/1
4 TABLET, ORALLY DISINTEGRATING ORAL EVERY 8 HOURS PRN
Qty: 20 TABLET | Refills: 0 | Status: SHIPPED | OUTPATIENT
Start: 2025-07-28

## 2025-07-28 RX ORDER — FLUCONAZOLE 200 MG/1
400 TABLET ORAL EVERY 24 HOURS
Qty: 28 TABLET | Refills: 0 | Status: SHIPPED | OUTPATIENT
Start: 2025-07-28 | End: 2025-08-11

## 2025-07-28 RX ORDER — DOXYCYCLINE 100 MG/1
100 CAPSULE ORAL 2 TIMES DAILY
Qty: 10 CAPSULE | Refills: 0 | Status: SHIPPED | OUTPATIENT
Start: 2025-07-28 | End: 2025-08-02

## 2025-07-28 RX ADMIN — GUAIFENESIN 600 MG: 600 TABLET, EXTENDED RELEASE ORAL at 09:27

## 2025-07-28 RX ADMIN — DOXYCYCLINE 100 MG: 100 INJECTION, POWDER, LYOPHILIZED, FOR SOLUTION INTRAVENOUS at 03:17

## 2025-07-28 RX ADMIN — ENOXAPARIN SODIUM 40 MG: 100 INJECTION SUBCUTANEOUS at 09:28

## 2025-07-28 RX ADMIN — AMPICILLIN SODIUM AND SULBACTAM SODIUM 3 G: 2; 1 INJECTION, POWDER, FOR SOLUTION INTRAMUSCULAR; INTRAVENOUS at 03:16

## 2025-07-28 RX ADMIN — AMPICILLIN SODIUM AND SULBACTAM SODIUM 3 G: 2; 1 INJECTION, POWDER, FOR SOLUTION INTRAMUSCULAR; INTRAVENOUS at 09:28

## 2025-07-28 RX ADMIN — DESVENLAFAXINE SUCCINATE 100 MG: 50 TABLET, EXTENDED RELEASE ORAL at 09:27

## 2025-07-28 RX ADMIN — Medication 10 ML: at 09:29

## 2025-07-28 RX ADMIN — BUSPIRONE HYDROCHLORIDE 10 MG: 10 TABLET ORAL at 09:27

## 2025-07-28 NOTE — PLAN OF CARE
Goal Outcome Evaluation:      Pt appropriate for discharge per Dr. Pittman.

## 2025-07-28 NOTE — CASE MANAGEMENT/SOCIAL WORK
Case Management/Social Work    Patient Name:  Yuki Singh  YOB: 2000  MRN: [id number]  Admit Date:  7/26/2025        08:06 EDT   Discharge Plan       Row Name 07/28/25 0805       Plan    Plan dcp home/alone                        Electronically signed by:  Naomi Worley RN  07/28/25 08:06 EDT

## 2025-07-28 NOTE — PLAN OF CARE
Goal Outcome Evaluation:  Plan of Care Reviewed With: patient        Progress: improving  Outcome Evaluation: AVSS, fluids and call light within reach, family at bedside

## 2025-07-28 NOTE — CASE MANAGEMENT/SOCIAL WORK
Case Management Discharge Note                Selected Continued Care - Admitted Since 7/26/2025       Destination    No services have been selected for the patient.                Durable Medical Equipment    No services have been selected for the patient.                Dialysis/Infusion    No services have been selected for the patient.                Home Medical Care    No services have been selected for the patient.                Therapy    No services have been selected for the patient.                Community [facility]    No services have been selected for the patient.                [facility]    No services have been selected for the patient.                    Transportation Services  Transportation: Private Transportation  Private: Car    Final Discharge Disposition Code: 01 - home or self-care

## 2025-07-28 NOTE — DISCHARGE SUMMARY
[facility]      Name:  Yuki Singh   Age:  24 y.o.  Sex:  female  :  2000  MRN:  [id number]   Visit Number:  [id number]    Admission Date:  2025  Date of Discharge:  2025  Primary Care Physician:  Cindi Sheriff MD    Important issues to note:    Start: Doxycycline, fluconazole, Zofran  Stop: Nothing  Follow up: PCP   Brief Summary: Presented with pneumonia due to bacterial versus fungal. Initially had signs of sepsis which had significantly improved by the time of discharge.  Also had risk factor for Coccidioides  including travel to endemic area close relative with disease.  Treating for both bacterial and fungal causes.  If Coccidioides antibodies negative can discontinue fluconazole.  If it becomes positive we will need to continue 6 to 12 weeks.  Encouraged her to discuss this with her primary care and have them follow-up on this at her TCM visit.  Also encouraged her to have a CT of her chest checked at 1 month.    Discharge Diagnoses:     Sepsis  Pneumonia  Hypokalemia  Depression  Obesity         Problem List:     Active Hospital Problems    Diagnosis  POA    **Pneumonia [J18.9]  Yes      Resolved Hospital Problems   No resolved problems to display.     Presenting Problem:    Chief Complaint   Patient presents with    Chest Pain    Shortness of Breath      Consults:     Consulting Physician(s)                     None                  History Of Presenting Illness:       Patient is a 24-year-old female with history significant for anxiety/depression who presents to the emergency room tonight with progressively worsening shortness of breath and associated chest pain.  Patient describes chest pain as a sharp sensation worse with deep inspiration on the left side.  Denies pressure-like sensation or radiation of pain.  She states that she has been feeling sick for approximately 1 week with shortness of breath and cough productive of yellow  sputum.  Patient also admits to nasal congestion and nausea with some posttussive emesis.  She states that she returned from a flight from [place] prior to symptom onset.  Was seen in urgent care on 7/24 and prescribed Augmentin for a sinus infection.  Patient has been compliant with her antibiotic therapy but symptoms worsened.  Patient's mother advised her to come to the emergency room tonight.  Patient denies smoking or vaping.  Denies alcohol and illicit drug use.  She has never had pneumonia before and has no underlying lung pathology.  Workup in the emergency room consistent with sepsis secondary to left-sided pneumonia.  Fortunately she is stable on room air.  As patient has failed outpatient antibiotic therapy, hospitalist has been asked to admit for initiation of IV antibiotics.    Edited by: Cirilo Pittman DO at 7/27/2025 [facility]    Hospital Course:    This patient's problems and plans were partially entered by my partner and updated as appropriate by me 07/27/25.    7/28/2025: Patient continues to improve clinically maintaining oxygen sats on room air heart rate improved WBC improved.  Agreeable to discharge.  Instructed to discuss her results from her Coccidioides testing with her primary care and follow-up.    Sepsis  Pneumonia  R/o [facility] Fever  Initially met SIRS criteria however significantly improved by the time of discharge.  COVID/Flu negative.  HIV negative.  Treatment: Diflucan, Unasyn, doxycycline.  Doxycycline switched to pills.  Unasyn switched back to Augmentin as I do not think this was actually a failure as she had only taken 3 doses prior to coming to the hospital.  Plus she responded well to Unasyn while inpatient.  Pending: Outpatient CT chest at 1 month, Coccidioides antibody testing  Hypokalemia  Replete as indicated per protocol, suspect dilutional  Depression  Continue home medications  Obesity  Complicates all aspects of care    Code Status: Full  Diet:  Regular  Disposition: Home independently  Edited by: Cirilo Pittman,  at 7/27/2025 1040      Vital Signs:    Temp:  [97.9 °F (36.6 °C)-99.7 °F (37.6 °C)] 98.3 °F (36.8 °C)  Heart Rate:  [] 94  Resp:  [16-20] 18  BP: ()/(61-82) 99/61    Physical Exam:    Constitutional: No acute distress, awake, alert  HENT: NCAT, mucous membranes moist, nasal congestion noted  Respiratory: Clear to auscultation bilaterally, respiratory effort normal   Cardiovascular: RRR, no murmurs, rubs, or gallops  Gastrointestinal: Positive bowel sounds, soft, nontender, nondistended  Musculoskeletal: No bilateral ankle edema  Psychiatric: Appropriate affect, cooperative  Neurologic: Oriented x 3, speech clear  Skin: No rashes  Exam stable 7/28/2025    Pertinent Lab Results:     Results from last 7 days   Lab Units 07/28/25  0616 07/27/25  2326 07/27/25  1124 07/27/25  0540 07/26/25  2205   SODIUM mmol/L 139  --   --  137 137   POTASSIUM mmol/L 4.2 4.2 3.6 3.4* 3.2*   CHLORIDE mmol/L 109*  --   --  104 99   CO2 mmol/L 20.3*  --   --  20.1* 22.4   BUN mg/dL 7.0  --   --  10.0 12.0   CREATININE mg/dL 0.65  --   --  0.68 0.92   CALCIUM mg/dL 8.8  --   --  8.7 9.4   BILIRUBIN mg/dL  --   --   --   --  1.6*   ALK PHOS U/L  --   --   --   --  85   ALT (SGPT) U/L  --   --   --   --  24   AST (SGOT) U/L  --   --   --   --  18   GLUCOSE mg/dL 80  --   --  83 90     Results from last 7 days   Lab Units 07/28/25 0616 07/27/25  0540 07/26/25  2205   WBC 10*3/mm3 11.20* 14.53* 17.89*   HEMOGLOBIN g/dL 10.7* 10.4* 13.2   HEMATOCRIT % 31.2* 29.9* 37.8   PLATELETS 10*3/mm3 253 224 260         Results from last 7 days   Lab Units 07/26/25  2307 07/26/25  2205   HSTROP T ng/L <6 <6                     Results from last 7 days   Lab Units 07/26/25  2252 07/26/25  2242   BLOODCX  No growth at 24 hours No growth at 24 hours       Pertinent Radiology Results:    Imaging Results (All)       Procedure Component Value Units Date/Time    XR Chest 1  View [[id number]] Collected: 07/27/25 1441     Updated: 07/27/25 1444    Narrative:      PROCEDURE: XR CHEST 1 VW-     HISTORY: Chest Pain Triage Protocol     COMPARISON: None.     FINDINGS: The heart is normal in size. The mediastinum is unremarkable.  Large left-sided opacity, correlate for probable pneumonia. Questionable  right perihilar infiltrate.   There is no pneumothorax.  There are no acute osseous abnormalities.       Impression:      Large left-sided opacity, correlate for probable pneumonia.  Questionable right perihilar infiltrate.     Continued followup is recommended.              This report was signed and finalized on 7/27/2025 2:42 PM by Anatoly Gold DO.       CT Angiogram Chest Pulmonary Embolism [[id number]] Collected: 07/27/25 0018     Updated: 07/27/25 0020    Narrative:      FINAL REPORT    TECHNIQUE:  null    CLINICAL HISTORY:  soa, tachycardia, abnormal cxr    COMPARISON:  null    FINDINGS:  CTA chest    Comparison: None provided.    Findings:    Heterogeneity in pulmonary arteries felt to be artifactual secondary to body habitus and respiratory motion with no pulmonary embolism appreciated. Close CTA follow-up recommended as clinically indicated. Slight left pleural effusion. Large consolidation   with air bronchograms left upper lobe. Patchy small consolidations and groundglass densities bilaterally. These findings most likely represent pneumonia. CT follow-up to resolution recommended as other underlying pathologic process including malignancy   cannot be excluded. No significant adenopathy seen. No pneumothorax. Unremarkable thoracic aorta with no aneurysm or dissection.      Impression:      Impression:    1. No PE seen.    2. Large left upper lobe consolidation, patchy bilateral groundglass densities, and slight left pleural effusion most likely related to pneumonia. Follow-up to resolution recommended.    Authenticated and Electronically Signed by Bar Garcia MD on  07/27/2025  12:18:05 AM            Echo:      Condition on Discharge:      Stable.    Code status during the hospital stay:    Code Status and Medical Interventions: CPR (Attempt to Resuscitate); Full Support   Ordered at: 07/27/25 0053     Code Status (Patient has no pulse and is not breathing):    CPR (Attempt to Resuscitate)     Medical Interventions (Patient has pulse or is breathing):    Full Support     Discharge Disposition:    Home or Self Care    Discharge Medications:       Discharge Medications        New Medications        Instructions Start Date   doxycycline 100 MG capsule  Commonly known as: VIBRAMYCIN   100 mg, Oral, 2 Times Daily      fluconazole 200 MG tablet  Commonly known as: DIFLUCAN   400 mg, Oral, Every 24 Hours      ondansetron ODT 4 MG disintegrating tablet  Commonly known as: ZOFRAN-ODT   4 mg, Translingual, Every 8 Hours PRN             Continue These Medications        Instructions Start Date   busPIRone 10 MG tablet  Commonly known as: BUSPAR   10 mg, Oral, 2 Times Daily      desvenlafaxine 100 MG 24 hr tablet  Commonly known as: PRISTIQ   100 mg, Oral, Daily      phentermine 37.5 MG tablet  Commonly known as: ADIPEX-P   37.5 mg, Every Morning Before Breakfast      traZODone 50 MG tablet  Commonly known as: DESYREL   50 mg, Oral, Nightly             Discharge Diet:     Diet Instructions       Advance Diet As Tolerated -Target Diet: Regular      Target Diet: Regular          Activity at Discharge:       Follow-up Appointments:    Additional Instructions for the Follow-ups that You Need to Schedule       Discharge Follow-up with PCP   As directed       Currently Documented PCP:    Cindi Sheriff MD    PCP Phone Number:    [phone]     Follow Up Details: 1 week               Follow-up Information       Cindi Sheriff MD .    Specialty: Family Medicine  Why: 1 week  Contact information:  [address] [place] [address]  [phone]                           Future Appointments    Date Time Provider Department [place]   7/30/2025  2:30 PM Shari Doty APRN [facility]  [facility]     Test Results Pending at Discharge:    Pending Labs       Order Current Status    Coccidioides immitis Antibodies, IgG and IgM, EIA In process    Blood Culture With ABBY - Blood, Arm, Right Preliminary result    Blood Culture With ABBY - Blood, Hand, Left Preliminary result    Respiratory Culture - Sputum, Cough Preliminary result               Cirilo Pittman DO  07/28/25  08:59 EDT    Time: I spent 45 minutes on this discharge activity which included: face-to-face encounter with the patient, reviewing the data in the system, coordination of the care with the nursing staff as well as consultants, documentation, and entering orders.     Dictated utilizing Dragon dictation.

## 2025-07-29 LAB
BACTERIA SPEC RESP CULT: NORMAL
GRAM STN SPEC: NORMAL

## 2025-07-30 ENCOUNTER — TELEMEDICINE (OUTPATIENT)
Dept: PSYCHIATRY | Facility: CLINIC | Age: 25
End: 2025-07-30
Payer: COMMERCIAL

## 2025-07-30 DIAGNOSIS — F41.1 GENERALIZED ANXIETY DISORDER: Primary | Chronic | ICD-10-CM

## 2025-07-30 DIAGNOSIS — F33.1 MODERATE EPISODE OF RECURRENT MAJOR DEPRESSIVE DISORDER: Chronic | ICD-10-CM

## 2025-07-30 PROCEDURE — 99214 OFFICE O/P EST MOD 30 MIN: CPT | Performed by: NURSE PRACTITIONER

## 2025-07-30 NOTE — PROGRESS NOTES
"Mode of Visit: Video  Location of patient: -HOME-  Location of provider: +HOME+  You have chosen to receive care through a telehealth visit.  The patient has signed the video visit consent form.  The visit included audio and video interaction. No technical issues occurred during this visit.      Chief Complaint  Anxiety and Depression      Subjective              Yuki Singh presents today via [facility] through [employer] for medication management of her anxiety and depression.     History of Present Illness: Patient presents today for follow-up appointment after last being seen on 04/23/2025.  She presents today and reports she was discharged from the hospital 2 days ago.  Patient says \"I was on my deathbed on Saturday\".  She had been at [facility] and returned with the possible fungal infection that led to sepsis and pneumonia.  She says she is still waiting on the test results to return but does say she is feeling better.  She says \"I am not 100%, but I am not nearly as weak as I was\".  She is staying with some friends right now because she was not able to manage everything on her own due to being so ill.  She says aside from getting sick, the trip [place] went very well.  She had a good time with her family \"but I was ready to come back home by the time of\".  She reports having a good visit with her nieces and nephews.  She says she has been consistent with her medications since her last appointment, with the exception of trazodone.  She takes her Pristiq and buspirone as prescribed but says she has no longer needed trazodone and says she is able to get to sleep and stay asleep well without it.  Overall, she feels things have been going well with her medications.  She is also still participating in talk therapy on a consistent basis and says that is going also.  She is eating well and denies any concerns with her appetite.  She denies any SI/HI, A/V hallucinations.        The following portions of the " patient's history were reviewed and updated as appropriate: allergies, current medications, past family history, past medical history, past social history, past surgical history and problem list.      Current Medications:   Current Outpatient Medications   Medication Sig Dispense Refill    busPIRone (BUSPAR) 10 MG tablet Take 1 tablet by mouth 2 (Two) Times a Day. 180 tablet 3    desvenlafaxine (PRISTIQ) 100 MG 24 hr tablet Take 1 tablet by mouth Daily. 90 tablet 3    doxycycline (VIBRAMYCIN) 100 MG capsule Take 1 capsule by mouth 2 (Two) Times a Day for 5 days. 10 capsule 0    fluconazole (DIFLUCAN) 200 MG tablet Take 2 tablets by mouth Daily for 14 doses. Indications: [facility] Fever 28 tablet 0    ondansetron ODT (ZOFRAN-ODT) 4 MG disintegrating tablet Place 1 tablet on the tongue Every 8 (Eight) Hours As Needed for Nausea or Vomiting. 20 tablet 0     No current facility-administered medications for this visit.       Mental Status Exam:   Hygiene:   MyChart video  Cooperation:  Cooperative  Eye Contact:  Good  Psychomotor Behavior:  Appropriate  Affect:  Appropriate  Mood: euthymic  Speech:  Normal  Thought Process:  Goal directed  Thought Content:  Mood congruent  Suicidal:  None  Homicidal:  None  Hallucinations:  None  Delusion:  None  Memory:  Intact  Orientation:  Person, Place, Time, and Situation  Reliability:  good  Insight:  Good  Judgement:  Good  Impulse Control:  Good  Physical/Medical Issues:  PCOS, GERD       Objective   Vital Signs:   There were no vitals taken for this visit.    Physical Exam  Neurological:      Mental Status: She is oriented to person, place, and time. Mental status is at baseline.   Psychiatric:         Behavior: Behavior is cooperative.        Result Review :     The following data was reviewed by: ADEN Shah on 07/30/2025:    Data reviewed : Previous notes, medication history          Assessment and Plan    Diagnoses and all orders for this visit:    1.  Generalized anxiety disorder (Primary)    2. Moderate episode of recurrent major depressive disorder               PHQ-9 Score:   PHQ-9 Total Score: (Patient-Rptd) 10       Depression Screening:  Patient screened positive for depression based on a PHQ-9 score of 10 on 7/30/2025. Follow-up recommendations include: Prescribed antidepressant medication treatment and Suicide Risk Assessment performed.        Tobacco Cessation:  Patient has denied an present or past tobacco use. No tobacco cessation education necessary.       Impression/Plan:  -This is a follow-up appointment.  Patient presents today and reports she has been doing pretty well overall since she was last seen.  She says she is still taking her medications as prescribed, with the exception of trazodone as discussed above.  She feels her regimen is still working well for her depression and anxiety and says she has no new issues or concerns today.  She says she is pleased with symptom burden.  She has been getting from her medications.  I encouraged her to continue taking them as prescribed and to continue attending her scheduled therapy appointments.  We will maintain her medication regimen as she has been taking them and follow up in 3 months, she is in agreement with this plan.  -Maintain Pristiq 100 mg daily for depression and anxiety.  -Maintain buspirone 10 mg twice daily for anxiety.  -Discontinue trazodone 50 mg nightly for sleeping difficulties.  -Patient's [facility] report reviewed and deemed appropriate.  Patient counseled on use of controlled substances.  -Reviewed available lab work.  -Schedule MyChart video follow-up appointment for 3 months or as needed.    MEDS ORDERED DURING VISIT:  No orders of the defined types were placed in this encounter.        Follow Up   Return in about 3 months (around 10/30/2025), or if symptoms worsen or fail to improve, for Next scheduled follow up, Video visit.  Patient was given instructions and counseling  regarding her condition or for health maintenance advice. Please see specific information pulled into the AVS if appropriate.       TREATMENT PLAN/GOALS: Continue supportive psychotherapy efforts and medications as indicated. Treatment and medication options discussed during today's visit. Patient acknowledged and verbally consented to continue with current treatment plan and was educated on the importance of compliance with treatment and follow-up appointments.    MEDICATION ISSUES:  Discussed medication options and treatment plan of prescribed medication as well as the risks, benefits, and side effects including potential falls, possible impaired driving and metabolic adversities among others. Patient is agreeable to call the office with any worsening of symptoms or onset of side effects. Patient is agreeable to call 911 or go to the nearest ER should he/she begin having SI/HI.          This document has been electronically signed by ADEN Gonzalez, PMHNP-BC  July 30, 2025 14:57 EDT      Part of this note may be an electronic transcription/translation of spoken language to printed text using the Dragon Dictation System.

## 2025-07-31 LAB
BACTERIA SPEC AEROBE CULT: NORMAL
BACTERIA SPEC AEROBE CULT: NORMAL
C IMMITIS IGG SER QL IA: 0.1 EIA UNITS
C IMMITIS IGM SER QL IA: 0.1 EIA UNITS

## 2025-08-20 ENCOUNTER — TRANSCRIBE ORDERS (OUTPATIENT)
Dept: ADMINISTRATIVE | Facility: HOSPITAL | Age: 25
End: 2025-08-20
Payer: COMMERCIAL

## 2025-08-20 DIAGNOSIS — J18.9 PNEUMONIA OF LEFT LUNG DUE TO INFECTIOUS ORGANISM, UNSPECIFIED PART OF LUNG: Primary | ICD-10-CM
